# Patient Record
Sex: FEMALE | Race: ASIAN | URBAN - METROPOLITAN AREA
[De-identification: names, ages, dates, MRNs, and addresses within clinical notes are randomized per-mention and may not be internally consistent; named-entity substitution may affect disease eponyms.]

---

## 2023-06-01 LAB
ALANINE AMINOTRANSFERASE (SGPT) (U/L) IN SER/PLAS: 15 U/L (ref 7–45)
ALBUMIN (G/DL) IN SER/PLAS: 4 G/DL (ref 3.4–5)
ALKALINE PHOSPHATASE (U/L) IN SER/PLAS: 94 U/L (ref 33–136)
ANION GAP IN SER/PLAS: 15 MMOL/L (ref 10–20)
ASPARTATE AMINOTRANSFERASE (SGOT) (U/L) IN SER/PLAS: 13 U/L (ref 9–39)
BASOPHILS (10*3/UL) IN BLOOD BY AUTOMATED COUNT: 0.01 X10E9/L (ref 0–0.1)
BASOPHILS/100 LEUKOCYTES IN BLOOD BY AUTOMATED COUNT: 0.2 % (ref 0–2)
BILIRUBIN TOTAL (MG/DL) IN SER/PLAS: 0.3 MG/DL (ref 0–1.2)
C REACTIVE PROTEIN (MG/L) IN SER/PLAS: 4.75 MG/DL
CALCIUM (MG/DL) IN SER/PLAS: 9.7 MG/DL (ref 8.6–10.6)
CARBON DIOXIDE, TOTAL (MMOL/L) IN SER/PLAS: 28 MMOL/L (ref 21–32)
CHLORIDE (MMOL/L) IN SER/PLAS: 103 MMOL/L (ref 98–107)
CREATININE (MG/DL) IN SER/PLAS: 0.4 MG/DL (ref 0.5–1.05)
EOSINOPHILS (10*3/UL) IN BLOOD BY AUTOMATED COUNT: 0.1 X10E9/L (ref 0–0.7)
EOSINOPHILS/100 LEUKOCYTES IN BLOOD BY AUTOMATED COUNT: 1.8 % (ref 0–6)
ERYTHROCYTE DISTRIBUTION WIDTH (RATIO) BY AUTOMATED COUNT: 12.8 % (ref 11.5–14.5)
ERYTHROCYTE MEAN CORPUSCULAR HEMOGLOBIN CONCENTRATION (G/DL) BY AUTOMATED: 32.4 G/DL (ref 32–36)
ERYTHROCYTE MEAN CORPUSCULAR VOLUME (FL) BY AUTOMATED COUNT: 93 FL (ref 80–100)
ERYTHROCYTES (10*6/UL) IN BLOOD BY AUTOMATED COUNT: 3.89 X10E12/L (ref 4–5.2)
GFR FEMALE: >90 ML/MIN/1.73M2
GLUCOSE (MG/DL) IN SER/PLAS: 95 MG/DL (ref 74–99)
HEMATOCRIT (%) IN BLOOD BY AUTOMATED COUNT: 36.1 % (ref 36–46)
HEMOGLOBIN (G/DL) IN BLOOD: 11.7 G/DL (ref 12–16)
IMMATURE GRANULOCYTES/100 LEUKOCYTES IN BLOOD BY AUTOMATED COUNT: 0.5 % (ref 0–0.9)
LEUKOCYTES (10*3/UL) IN BLOOD BY AUTOMATED COUNT: 5.6 X10E9/L (ref 4.4–11.3)
LYMPHOCYTES (10*3/UL) IN BLOOD BY AUTOMATED COUNT: 0.88 X10E9/L (ref 1.2–4.8)
LYMPHOCYTES/100 LEUKOCYTES IN BLOOD BY AUTOMATED COUNT: 15.6 % (ref 13–44)
MONOCYTES (10*3/UL) IN BLOOD BY AUTOMATED COUNT: 0.57 X10E9/L (ref 0.1–1)
MONOCYTES/100 LEUKOCYTES IN BLOOD BY AUTOMATED COUNT: 10.1 % (ref 2–10)
NEUTROPHILS (10*3/UL) IN BLOOD BY AUTOMATED COUNT: 4.04 X10E9/L (ref 1.2–7.7)
NEUTROPHILS/100 LEUKOCYTES IN BLOOD BY AUTOMATED COUNT: 71.8 % (ref 40–80)
NRBC (PER 100 WBCS) BY AUTOMATED COUNT: 0 /100 WBC (ref 0–0)
PLATELETS (10*3/UL) IN BLOOD AUTOMATED COUNT: 420 X10E9/L (ref 150–450)
POTASSIUM (MMOL/L) IN SER/PLAS: 4.2 MMOL/L (ref 3.5–5.3)
PROTEIN TOTAL: 7.5 G/DL (ref 6.4–8.2)
RHEUMATOID FACTOR (IU/ML) IN SERUM OR PLASMA: 330 IU/ML (ref 0–15)
SEDIMENTATION RATE, ERYTHROCYTE: 74 MM/H (ref 0–30)
SODIUM (MMOL/L) IN SER/PLAS: 142 MMOL/L (ref 136–145)
URATE (MG/DL) IN SER/PLAS: 4.5 MG/DL (ref 2.3–6.7)
UREA NITROGEN (MG/DL) IN SER/PLAS: 16 MG/DL (ref 6–23)

## 2023-06-05 LAB
ALBUMIN ELP: 3.8 G/DL (ref 3.4–5)
ALPHA 1: 0.4 G/DL (ref 0.2–0.6)
ALPHA 2: 0.9 G/DL (ref 0.4–1.1)
BETA: 1.1 G/DL (ref 0.5–1.2)
CITRULLINE ANTIBODY, IGG: 116 UNITS (ref 0–19)
GAMMA GLOBULIN: 1.4 G/DL (ref 0.5–1.4)
PATH REVIEW - SERUM IMMUNOFIXATION: NORMAL
PATH REVIEW-SERUM PROTEIN ELECTROPHORESIS: NORMAL
PROTEIN ELECTROPHORESIS INTERPRETATION: NORMAL
PROTEIN TOTAL: 7.5 G/DL (ref 6.4–8.2)
SERUM IMMUNOFIXATION INTERPRETATION: NORMAL

## 2023-08-03 LAB
BASOPHILS (10*3/UL) IN BLOOD BY AUTOMATED COUNT: 0.02 X10E9/L (ref 0–0.1)
BASOPHILS/100 LEUKOCYTES IN BLOOD BY AUTOMATED COUNT: 0.2 % (ref 0–2)
EOSINOPHILS (10*3/UL) IN BLOOD BY AUTOMATED COUNT: 0.06 X10E9/L (ref 0–0.7)
EOSINOPHILS/100 LEUKOCYTES IN BLOOD BY AUTOMATED COUNT: 0.7 % (ref 0–6)
ERYTHROCYTE DISTRIBUTION WIDTH (RATIO) BY AUTOMATED COUNT: 12.8 % (ref 11.5–14.5)
ERYTHROCYTE MEAN CORPUSCULAR HEMOGLOBIN CONCENTRATION (G/DL) BY AUTOMATED: 32.6 G/DL (ref 32–36)
ERYTHROCYTE MEAN CORPUSCULAR VOLUME (FL) BY AUTOMATED COUNT: 92 FL (ref 80–100)
ERYTHROCYTES (10*6/UL) IN BLOOD BY AUTOMATED COUNT: 4.07 X10E12/L (ref 4–5.2)
HEMATOCRIT (%) IN BLOOD BY AUTOMATED COUNT: 37.4 % (ref 36–46)
HEMOGLOBIN (G/DL) IN BLOOD: 12.2 G/DL (ref 12–16)
IMMATURE GRANULOCYTES/100 LEUKOCYTES IN BLOOD BY AUTOMATED COUNT: 0.6 % (ref 0–0.9)
LEUKOCYTES (10*3/UL) IN BLOOD BY AUTOMATED COUNT: 9 X10E9/L (ref 4.4–11.3)
LYMPHOCYTES (10*3/UL) IN BLOOD BY AUTOMATED COUNT: 0.81 X10E9/L (ref 1.2–4.8)
LYMPHOCYTES/100 LEUKOCYTES IN BLOOD BY AUTOMATED COUNT: 9 % (ref 13–44)
MONOCYTES (10*3/UL) IN BLOOD BY AUTOMATED COUNT: 0.38 X10E9/L (ref 0.1–1)
MONOCYTES/100 LEUKOCYTES IN BLOOD BY AUTOMATED COUNT: 4.2 % (ref 2–10)
NEUTROPHILS (10*3/UL) IN BLOOD BY AUTOMATED COUNT: 7.66 X10E9/L (ref 1.2–7.7)
NEUTROPHILS/100 LEUKOCYTES IN BLOOD BY AUTOMATED COUNT: 85.3 % (ref 40–80)
NRBC (PER 100 WBCS) BY AUTOMATED COUNT: 0 /100 WBC (ref 0–0)
PLATELETS (10*3/UL) IN BLOOD AUTOMATED COUNT: 394 X10E9/L (ref 150–450)

## 2023-08-04 LAB
ALANINE AMINOTRANSFERASE (SGPT) (U/L) IN SER/PLAS: 19 U/L (ref 7–45)
ALBUMIN (G/DL) IN SER/PLAS: 4.3 G/DL (ref 3.4–5)
ALKALINE PHOSPHATASE (U/L) IN SER/PLAS: 67 U/L (ref 33–136)
ANION GAP IN SER/PLAS: 16 MMOL/L (ref 10–20)
ASPARTATE AMINOTRANSFERASE (SGOT) (U/L) IN SER/PLAS: 18 U/L (ref 9–39)
BILIRUBIN TOTAL (MG/DL) IN SER/PLAS: 0.4 MG/DL (ref 0–1.2)
C REACTIVE PROTEIN (MG/L) IN SER/PLAS: 3.79 MG/DL
CALCIUM (MG/DL) IN SER/PLAS: 10.6 MG/DL (ref 8.6–10.6)
CARBON DIOXIDE, TOTAL (MMOL/L) IN SER/PLAS: 26 MMOL/L (ref 21–32)
CHLORIDE (MMOL/L) IN SER/PLAS: 104 MMOL/L (ref 98–107)
CREATININE (MG/DL) IN SER/PLAS: 0.49 MG/DL (ref 0.5–1.05)
GFR FEMALE: >90 ML/MIN/1.73M2
GLUCOSE (MG/DL) IN SER/PLAS: 122 MG/DL (ref 74–99)
POTASSIUM (MMOL/L) IN SER/PLAS: 4.4 MMOL/L (ref 3.5–5.3)
PROTEIN TOTAL: 7.8 G/DL (ref 6.4–8.2)
SEDIMENTATION RATE, ERYTHROCYTE: 59 MM/H (ref 0–30)
SODIUM (MMOL/L) IN SER/PLAS: 142 MMOL/L (ref 136–145)
UREA NITROGEN (MG/DL) IN SER/PLAS: 16 MG/DL (ref 6–23)

## 2023-09-08 PROBLEM — M54.2 NECK PAIN: Status: ACTIVE | Noted: 2023-09-08

## 2023-09-08 PROBLEM — M17.11 OSTEOARTHRITIS OF RIGHT KNEE: Status: ACTIVE | Noted: 2023-09-08

## 2023-09-08 PROBLEM — N95.1 MENOPAUSAL SYMPTOM: Status: ACTIVE | Noted: 2023-09-08

## 2023-09-08 PROBLEM — J30.9 ALLERGIC RHINITIS: Status: ACTIVE | Noted: 2023-09-08

## 2023-09-08 PROBLEM — M25.569 KNEE PAIN: Status: ACTIVE | Noted: 2023-09-08

## 2023-09-08 PROBLEM — N95.1 FEMALE CLIMACTERIC STATE: Status: ACTIVE | Noted: 2023-09-08

## 2023-09-08 PROBLEM — N95.2 POSTMENOPAUSAL ATROPHIC VAGINITIS: Status: ACTIVE | Noted: 2023-09-08

## 2023-09-08 PROBLEM — R91.1 LUNG NODULE: Status: ACTIVE | Noted: 2023-09-08

## 2023-09-08 PROBLEM — M81.0 AGE-RELATED OSTEOPOROSIS WITHOUT CURRENT PATHOLOGICAL FRACTURE: Status: ACTIVE | Noted: 2023-09-08

## 2023-09-08 PROBLEM — M25.561 PAIN IN RIGHT KNEE: Status: ACTIVE | Noted: 2023-09-08

## 2023-09-08 PROBLEM — S83.91XA SPRAIN OF RIGHT KNEE: Status: ACTIVE | Noted: 2023-09-08

## 2023-09-08 PROBLEM — M93.90 OSTEOCHONDROPATHY: Status: ACTIVE | Noted: 2023-09-08

## 2023-09-08 PROBLEM — M25.519 SHOULDER JOINT PAIN: Status: ACTIVE | Noted: 2023-09-08

## 2023-09-08 PROBLEM — R06.2 WHEEZING: Status: ACTIVE | Noted: 2023-09-08

## 2023-09-08 PROBLEM — H61.20 WAX IN EAR: Status: ACTIVE | Noted: 2023-09-08

## 2023-09-08 PROBLEM — M17.12 OSTEOARTHRITIS OF LEFT KNEE: Status: ACTIVE | Noted: 2023-09-08

## 2023-09-08 PROBLEM — H61.23 IMPACTED CERUMEN OF BOTH EARS: Status: ACTIVE | Noted: 2023-09-08

## 2023-09-08 PROBLEM — S16.1XXA CERVICAL STRAIN: Status: ACTIVE | Noted: 2023-09-08

## 2023-09-08 PROBLEM — G47.00 INSOMNIA: Status: ACTIVE | Noted: 2023-09-08

## 2023-09-08 PROBLEM — M47.812 OSTEOARTHRITIS OF CERVICAL SPINE: Status: ACTIVE | Noted: 2023-09-08

## 2023-09-08 PROBLEM — J45.909 ASTHMATIC BRONCHITIS, UNSPECIFIED ASTHMA SEVERITY, UNCOMPLICATED (HHS-HCC): Status: ACTIVE | Noted: 2023-09-08

## 2023-09-08 PROBLEM — E78.5 HYPERLIPIDEMIA: Status: ACTIVE | Noted: 2023-09-08

## 2023-09-08 PROBLEM — R59.9 LYMPH NODES ENLARGED: Status: ACTIVE | Noted: 2023-09-08

## 2023-09-08 PROBLEM — J31.0 CHRONIC RHINITIS: Status: ACTIVE | Noted: 2023-09-08

## 2023-09-08 RX ORDER — TIZANIDINE 4 MG/1
1 TABLET ORAL 3 TIMES DAILY PRN
COMMUNITY
End: 2024-01-24 | Stop reason: WASHOUT

## 2023-09-08 RX ORDER — ALENDRONATE SODIUM 70 MG/1
1 TABLET ORAL
COMMUNITY
Start: 2022-11-28 | End: 2023-11-01

## 2023-09-08 RX ORDER — MONTELUKAST SODIUM 10 MG/1
1 TABLET ORAL DAILY
COMMUNITY
End: 2024-01-24 | Stop reason: WASHOUT

## 2023-09-08 RX ORDER — ATORVASTATIN CALCIUM 20 MG/1
1 TABLET, FILM COATED ORAL DAILY
COMMUNITY
End: 2024-01-24 | Stop reason: SDUPTHER

## 2023-09-08 RX ORDER — CETIRIZINE HYDROCHLORIDE 10 MG/1
TABLET ORAL
COMMUNITY
End: 2024-01-24 | Stop reason: WASHOUT

## 2023-09-08 RX ORDER — PNV NO.95/FERROUS FUM/FOLIC AC 28MG-0.8MG
TABLET ORAL DAILY
COMMUNITY
End: 2024-05-16 | Stop reason: WASHOUT

## 2023-09-08 RX ORDER — IBUPROFEN 200 MG
1 TABLET ORAL DAILY PRN
COMMUNITY
End: 2024-01-24 | Stop reason: WASHOUT

## 2023-09-08 RX ORDER — CALCIUM CARBONATE 500(1250)
1 TABLET ORAL 2 TIMES DAILY
COMMUNITY

## 2023-09-08 RX ORDER — ALBUTEROL SULFATE 90 UG/1
1 AEROSOL, METERED RESPIRATORY (INHALATION) AS NEEDED
COMMUNITY
End: 2024-02-05 | Stop reason: WASHOUT

## 2023-09-08 RX ORDER — MOMETASONE FUROATE AND FORMOTEROL FUMARATE DIHYDRATE 100; 5 UG/1; UG/1
2 AEROSOL RESPIRATORY (INHALATION) 2 TIMES DAILY
COMMUNITY
Start: 2017-11-06 | End: 2024-01-24 | Stop reason: WASHOUT

## 2023-09-08 RX ORDER — BUDESONIDE AND FORMOTEROL FUMARATE DIHYDRATE 80; 4.5 UG/1; UG/1
2 AEROSOL RESPIRATORY (INHALATION)
COMMUNITY

## 2023-09-08 RX ORDER — BUDESONIDE AND FORMOTEROL FUMARATE DIHYDRATE 160; 4.5 UG/1; UG/1
2 AEROSOL RESPIRATORY (INHALATION) 2 TIMES DAILY
COMMUNITY
Start: 2017-09-19 | End: 2024-01-24 | Stop reason: WASHOUT

## 2023-09-08 RX ORDER — GABAPENTIN 400 MG/1
1 CAPSULE ORAL EVERY 6 HOURS
COMMUNITY
End: 2024-01-24 | Stop reason: WASHOUT

## 2023-09-08 RX ORDER — ACETAMINOPHEN 500 MG
1 TABLET ORAL EVERY 4 HOURS PRN
COMMUNITY
End: 2024-02-05 | Stop reason: WASHOUT

## 2023-09-08 RX ORDER — BIOTIN 100 %
POWDER (GRAM) MISCELLANEOUS
COMMUNITY
End: 2024-01-24 | Stop reason: WASHOUT

## 2023-10-04 DIAGNOSIS — M19.90 INFLAMMATORY ARTHRITIS: Primary | ICD-10-CM

## 2023-10-04 RX ORDER — CELECOXIB 200 MG/1
200 CAPSULE ORAL ONCE AS NEEDED
COMMUNITY
Start: 2023-08-29 | End: 2023-10-04 | Stop reason: SDUPTHER

## 2023-10-04 RX ORDER — CELECOXIB 200 MG/1
200 CAPSULE ORAL ONCE AS NEEDED
Qty: 30 CAPSULE | Refills: 0 | Status: SHIPPED | OUTPATIENT
Start: 2023-10-04 | End: 2023-11-01

## 2023-10-23 ENCOUNTER — TELEPHONE (OUTPATIENT)
Dept: PRIMARY CARE | Facility: CLINIC | Age: 68
End: 2023-10-23

## 2023-10-30 DIAGNOSIS — M05.9 SEROPOSITIVE RHEUMATOID ARTHRITIS (MULTI): Primary | ICD-10-CM

## 2023-10-30 RX ORDER — METHOTREXATE 2.5 MG/1
TABLET ORAL
Qty: 28 TABLET | Refills: 0 | Status: SHIPPED | OUTPATIENT
Start: 2023-10-30 | End: 2023-12-07 | Stop reason: SDUPTHER

## 2023-10-30 RX ORDER — METHOTREXATE 2.5 MG/1
2.5 TABLET ORAL
COMMUNITY
Start: 2023-08-07 | End: 2023-10-30 | Stop reason: SDUPTHER

## 2023-10-31 DIAGNOSIS — M19.90 INFLAMMATORY ARTHRITIS: ICD-10-CM

## 2023-10-31 DIAGNOSIS — M81.0 AGE-RELATED OSTEOPOROSIS WITHOUT CURRENT PATHOLOGICAL FRACTURE: ICD-10-CM

## 2023-10-31 DIAGNOSIS — M05.9 RHEUMATOID ARTHRITIS WITH RHEUMATOID FACTOR, UNSPECIFIED (MULTI): ICD-10-CM

## 2023-11-01 RX ORDER — FOLIC ACID 1 MG/1
1 TABLET ORAL DAILY
Qty: 90 TABLET | Refills: 0 | Status: SHIPPED | OUTPATIENT
Start: 2023-11-01 | End: 2023-12-07 | Stop reason: SDUPTHER

## 2023-11-01 RX ORDER — ALENDRONATE SODIUM 70 MG/1
TABLET ORAL
Qty: 12 TABLET | Refills: 3 | Status: SHIPPED | OUTPATIENT
Start: 2023-11-01 | End: 2023-12-01 | Stop reason: SDUPTHER

## 2023-11-01 RX ORDER — CELECOXIB 200 MG/1
CAPSULE ORAL
Qty: 30 CAPSULE | Refills: 0 | Status: SHIPPED | OUTPATIENT
Start: 2023-11-01 | End: 2023-12-11

## 2023-11-09 ENCOUNTER — APPOINTMENT (OUTPATIENT)
Dept: PRIMARY CARE | Facility: CLINIC | Age: 68
End: 2023-11-09
Payer: COMMERCIAL

## 2023-12-01 ENCOUNTER — TELEPHONE (OUTPATIENT)
Dept: OBSTETRICS AND GYNECOLOGY | Facility: CLINIC | Age: 68
End: 2023-12-01
Payer: COMMERCIAL

## 2023-12-01 DIAGNOSIS — M81.0 AGE-RELATED OSTEOPOROSIS WITHOUT CURRENT PATHOLOGICAL FRACTURE: ICD-10-CM

## 2023-12-01 RX ORDER — ALENDRONATE SODIUM 70 MG/1
TABLET ORAL
Qty: 12 TABLET | Refills: 3 | Status: SHIPPED | OUTPATIENT
Start: 2023-12-01 | End: 2023-12-07 | Stop reason: SDUPTHER

## 2023-12-01 NOTE — TELEPHONE ENCOUNTER
Est pt last seen 11/28/2022 discuss Dexa / Annual sched for 02/05/2024 /  pt left vm requesting refill Alendronate 70 mg 1 po weekly / pt stated that she needs to have 90 day supply  for insurance / pt confirmed [facility] listed / Message to Dr Foss / ( ok to call ? )

## 2023-12-07 ENCOUNTER — LAB (OUTPATIENT)
Dept: LAB | Facility: LAB | Age: 68
End: 2023-12-07
Payer: MEDICARE

## 2023-12-07 ENCOUNTER — OFFICE VISIT (OUTPATIENT)
Dept: RHEUMATOLOGY | Facility: CLINIC | Age: 68
End: 2023-12-07
Payer: MEDICARE

## 2023-12-07 VITALS
SYSTOLIC BLOOD PRESSURE: 121 MMHG | DIASTOLIC BLOOD PRESSURE: 85 MMHG | BODY MASS INDEX: 23.03 KG/M2 | HEART RATE: 86 BPM | HEIGHT: 61 IN | WEIGHT: 122 LBS

## 2023-12-07 DIAGNOSIS — M06.9 RHEUMATOID ARTHRITIS, INVOLVING UNSPECIFIED SITE, UNSPECIFIED WHETHER RHEUMATOID FACTOR PRESENT (MULTI): ICD-10-CM

## 2023-12-07 DIAGNOSIS — M05.9 RHEUMATOID ARTHRITIS WITH RHEUMATOID FACTOR, UNSPECIFIED (MULTI): Primary | ICD-10-CM

## 2023-12-07 DIAGNOSIS — R94.5 ABNORMAL RESULTS OF LIVER FUNCTION STUDIES: ICD-10-CM

## 2023-12-07 DIAGNOSIS — M25.511 CHRONIC RIGHT SHOULDER PAIN: ICD-10-CM

## 2023-12-07 DIAGNOSIS — G89.29 CHRONIC RIGHT SHOULDER PAIN: ICD-10-CM

## 2023-12-07 DIAGNOSIS — M81.0 AGE-RELATED OSTEOPOROSIS WITHOUT CURRENT PATHOLOGICAL FRACTURE: ICD-10-CM

## 2023-12-07 DIAGNOSIS — M05.9 RHEUMATOID ARTHRITIS WITH POSITIVE RHEUMATOID FACTOR, INVOLVING UNSPECIFIED SITE (MULTI): Primary | ICD-10-CM

## 2023-12-07 DIAGNOSIS — M05.9 RHEUMATOID ARTHRITIS WITH POSITIVE RHEUMATOID FACTOR, INVOLVING UNSPECIFIED SITE (MULTI): ICD-10-CM

## 2023-12-07 DIAGNOSIS — M05.9 RHEUMATOID ARTHRITIS WITH RHEUMATOID FACTOR, UNSPECIFIED (MULTI): ICD-10-CM

## 2023-12-07 DIAGNOSIS — M05.9 SEROPOSITIVE RHEUMATOID ARTHRITIS (MULTI): ICD-10-CM

## 2023-12-07 LAB
ALBUMIN SERPL BCP-MCNC: 4.3 G/DL (ref 3.4–5)
ALP SERPL-CCNC: 77 U/L (ref 33–136)
ALT SERPL W P-5'-P-CCNC: 17 U/L (ref 7–45)
ANION GAP SERPL CALC-SCNC: 14 MMOL/L (ref 10–20)
AST SERPL W P-5'-P-CCNC: 17 U/L (ref 9–39)
BASOPHILS # BLD AUTO: 0.01 X10*3/UL (ref 0–0.1)
BASOPHILS NFR BLD AUTO: 0.1 %
BILIRUB SERPL-MCNC: 0.6 MG/DL (ref 0–1.2)
BUN SERPL-MCNC: 21 MG/DL (ref 6–23)
CALCIUM SERPL-MCNC: 9.9 MG/DL (ref 8.6–10.6)
CHLORIDE SERPL-SCNC: 102 MMOL/L (ref 98–107)
CO2 SERPL-SCNC: 27 MMOL/L (ref 21–32)
CREAT SERPL-MCNC: 0.48 MG/DL (ref 0.5–1.05)
CRP SERPL-MCNC: 3.29 MG/DL
EOSINOPHIL # BLD AUTO: 0.1 X10*3/UL (ref 0–0.7)
EOSINOPHIL NFR BLD AUTO: 1.4 %
ERYTHROCYTE [DISTWIDTH] IN BLOOD BY AUTOMATED COUNT: 12.9 % (ref 11.5–14.5)
ERYTHROCYTE [SEDIMENTATION RATE] IN BLOOD BY WESTERGREN METHOD: 50 MM/H (ref 0–30)
GFR SERPL CREATININE-BSD FRML MDRD: >90 ML/MIN/1.73M*2
GLUCOSE SERPL-MCNC: 112 MG/DL (ref 74–99)
HAV IGM SER QL: NONREACTIVE
HBV CORE IGM SER QL: NONREACTIVE
HBV SURFACE AG SERPL QL IA: NONREACTIVE
HCT VFR BLD AUTO: 39.1 % (ref 36–46)
HCV AB SER QL: NONREACTIVE
HGB BLD-MCNC: 12.9 G/DL (ref 12–16)
IMM GRANULOCYTES # BLD AUTO: 0.03 X10*3/UL (ref 0–0.7)
IMM GRANULOCYTES NFR BLD AUTO: 0.4 % (ref 0–0.9)
LYMPHOCYTES # BLD AUTO: 1.05 X10*3/UL (ref 1.2–4.8)
LYMPHOCYTES NFR BLD AUTO: 14.8 %
MCH RBC QN AUTO: 31.1 PG (ref 26–34)
MCHC RBC AUTO-ENTMCNC: 33 G/DL (ref 32–36)
MCV RBC AUTO: 94 FL (ref 80–100)
MONOCYTES # BLD AUTO: 0.24 X10*3/UL (ref 0.1–1)
MONOCYTES NFR BLD AUTO: 3.4 %
NEUTROPHILS # BLD AUTO: 5.68 X10*3/UL (ref 1.2–7.7)
NEUTROPHILS NFR BLD AUTO: 79.9 %
NRBC BLD-RTO: 0 /100 WBCS (ref 0–0)
PLATELET # BLD AUTO: 446 X10*3/UL (ref 150–450)
POTASSIUM SERPL-SCNC: 4 MMOL/L (ref 3.5–5.3)
PROT SERPL-MCNC: 7.9 G/DL (ref 6.4–8.2)
RBC # BLD AUTO: 4.15 X10*6/UL (ref 4–5.2)
SODIUM SERPL-SCNC: 139 MMOL/L (ref 136–145)
WBC # BLD AUTO: 7.1 X10*3/UL (ref 4.4–11.3)

## 2023-12-07 PROCEDURE — 85025 COMPLETE CBC W/AUTO DIFF WBC: CPT

## 2023-12-07 PROCEDURE — 99214 OFFICE O/P EST MOD 30 MIN: CPT | Performed by: INTERNAL MEDICINE

## 2023-12-07 PROCEDURE — 1159F MED LIST DOCD IN RCRD: CPT | Performed by: INTERNAL MEDICINE

## 2023-12-07 PROCEDURE — 80074 ACUTE HEPATITIS PANEL: CPT

## 2023-12-07 PROCEDURE — 86140 C-REACTIVE PROTEIN: CPT

## 2023-12-07 PROCEDURE — 85652 RBC SED RATE AUTOMATED: CPT

## 2023-12-07 PROCEDURE — 86481 TB AG RESPONSE T-CELL SUSP: CPT

## 2023-12-07 PROCEDURE — 36415 COLL VENOUS BLD VENIPUNCTURE: CPT

## 2023-12-07 PROCEDURE — 80053 COMPREHEN METABOLIC PANEL: CPT

## 2023-12-07 RX ORDER — METHOTREXATE 2.5 MG/1
TABLET ORAL
Qty: 84 TABLET | Refills: 0 | Status: SHIPPED | OUTPATIENT
Start: 2023-12-07 | End: 2024-01-24 | Stop reason: WASHOUT

## 2023-12-07 RX ORDER — KETOCONAZOLE 20 MG/ML
SHAMPOO, SUSPENSION TOPICAL
COMMUNITY
Start: 2023-10-22 | End: 2024-01-24 | Stop reason: WASHOUT

## 2023-12-07 RX ORDER — ALENDRONATE SODIUM 70 MG/1
TABLET ORAL
Qty: 12 TABLET | Refills: 1 | Status: SHIPPED | OUTPATIENT
Start: 2023-12-07 | End: 2024-01-24 | Stop reason: WASHOUT

## 2023-12-07 RX ORDER — FOLIC ACID 1 MG/1
1 TABLET ORAL DAILY
Qty: 90 TABLET | Refills: 0 | Status: SHIPPED | OUTPATIENT
Start: 2023-12-07 | End: 2024-02-01 | Stop reason: SDUPTHER

## 2023-12-07 RX ORDER — METHYLPREDNISOLONE 4 MG/1
TABLET ORAL
COMMUNITY
Start: 2023-08-08 | End: 2023-12-07 | Stop reason: WASHOUT

## 2023-12-07 RX ORDER — DOCUSATE SODIUM 100 MG/1
100 CAPSULE, LIQUID FILLED ORAL 2 TIMES DAILY
COMMUNITY
Start: 2023-05-23 | End: 2024-01-24 | Stop reason: WASHOUT

## 2023-12-07 RX ORDER — PREDNISONE 5 MG/1
5 TABLET ORAL DAILY
COMMUNITY
Start: 2023-08-04 | End: 2023-12-07 | Stop reason: WASHOUT

## 2023-12-07 RX ORDER — METHOCARBAMOL 750 MG/1
TABLET, FILM COATED ORAL
COMMUNITY
Start: 2023-04-18 | End: 2024-01-24 | Stop reason: WASHOUT

## 2023-12-07 RX ORDER — OXYCODONE HYDROCHLORIDE 5 MG/1
5 TABLET ORAL EVERY 8 HOURS PRN
COMMUNITY
Start: 2023-04-04 | End: 2024-01-24 | Stop reason: WASHOUT

## 2023-12-07 RX ORDER — COVID-19 ANTIGEN TEST
KIT MISCELLANEOUS
COMMUNITY
Start: 2023-03-17 | End: 2024-01-24 | Stop reason: WASHOUT

## 2023-12-07 NOTE — PROGRESS NOTES
Follow-up Rheumatology Patient Visit    Chief Complaint:  Noé Valente is a 68 y.o. female presenting today for Follow-up (Refills, pt requesting 3 months).    History of Presenting Problem:   68-year-old female with history of DJD of the cervical spine status post fusion presents with various joint complaint. Patient reported she started to have persistent joint complaints done in November 2021 mostly in her bilateral shoulders, it was initially attributed to her degenerative spine disease she underwent spinal surgery but symptoms have persisted and now she is having stiffness in her hands and knees she reports painful range of motion in her bilateral shoulder. And her wrists and hands. She reports occasional stiffness in her knees.  She has tried taking Aleve 2 pills twice a day which offers 50% relief.She is also on gabapentin  Today she reports about 80% improvement on current Regimen. She is tolerating MTX 7 pills weekly with folic acid.  She also takes Celebrex 200 mg at night.   She report issue with chronic right shoulder pain x 1 year  with diccult lifting this past 75 degress. She report occasional triggering of her fingers with less ROM.     Problem List:   Patient Active Problem List   Diagnosis    Age-related osteoporosis without current pathological fracture    Allergic rhinitis    Chronic rhinitis    Asthmatic bronchitis, unspecified asthma severity, uncomplicated    Cervical strain    Female climacteric state    Hyperlipidemia    Impacted cerumen of both ears    Insomnia    Pain in right knee    Knee pain    Low back pain    Lung nodule    Lymph nodes enlarged    Menopausal symptom    Neck pain    Osteoarthritis of cervical spine    Osteoarthritis of left knee    Osteoarthritis of right knee    Osteochondropathy    Postmenopausal atrophic vaginitis    Shoulder joint pain    Skin sensation disturbance    Sprain of right knee    Wax in ear    Wheezing       Past Medical History:   Past Medical History:    Diagnosis Date    Personal history of nicotine dependence     History of nicotine dependence    Personal history of other diseases of the respiratory system     History of asthma    Personal history of other diseases of the respiratory system     History of allergic rhinitis    Pulmonary fibrosis, unspecified (CMS/HCC)     Postinflammatory pulmonary fibrosis       Surgical History:   Past Surgical History:   Procedure Laterality Date    SINUS SURGERY  06/10/2013    Sinus Surgery        Allergies:   Allergies   Allergen Reactions    Erythromycin Unknown    Ibuprofen Unknown    Sulfa (Sulfonamide Antibiotics) Unknown       Medications:   Current Outpatient Medications:     acetaminophen (Tylenol Extra Strength) 500 mg tablet, Take 1 tablet (500 mg) by mouth every 4 hours if needed., Disp: , Rfl:     albuterol 90 mcg/actuation inhaler, Inhale 1 puff if needed., Disp: , Rfl:     atorvastatin (Lipitor) 20 mg tablet, Take 1 tablet (20 mg) by mouth once daily., Disp: , Rfl:     azelastine HCl (AZELASTINE NASL), Administer 2 sprays into affected nostril(s) 2 times a day., Disp: , Rfl:     biotin 100 % powder, as directed, Disp: , Rfl:     budesonide-formoteroL (Symbicort) 160-4.5 mcg/actuation inhaler, Inhale 2 puffs 2 times a day. RINSE MOUTH AFTER USE., Disp: , Rfl:     budesonide-formoteroL (Symbicort) 80-4.5 mcg/actuation inhaler, Inhale 2 puffs 2 times a day., Disp: , Rfl:     calcium carbonate (Oscal) 500 mg calcium (1,250 mg) tablet, Take 1 tablet (1,250 mg) by mouth 2 times a day., Disp: , Rfl:     cartilage/collagen/bor/hyalur (JOINT HEALTH ORAL), Take by mouth. As directed, Disp: , Rfl:     celecoxib (CeleBREX) 200 mg capsule, TAKE 1 CAPSULE BY MOUTH EVERY DAY AS NEEDED FOR MILD PAIN, Disp: 30 capsule, Rfl: 0    cetirizine (ZyrTEC) 10 mg tablet, Take by mouth., Disp: , Rfl:     cholecalciferol, vitamin D3, (VITAMIN D3 ORAL), Take 1 tablet by mouth once daily.  Vitamin D3 (1.2mg) 1.2 mg  Patient si tablet by  "mouth Daily, Disp: , Rfl:     cyanocobalamin (Vitamin B-12) 100 mcg tablet, once daily. As directed, Disp: , Rfl:     docusate sodium (Colace) 100 mg capsule, Take 1 capsule (100 mg) by mouth 2 times a day., Disp: , Rfl:     gabapentin (Neurontin) 400 mg capsule, Take 1 capsule (400 mg) by mouth every 6 hours., Disp: , Rfl:     ibuprofen 200 mg tablet, Take 1 tablet (200 mg) by mouth once daily as needed. With food or milk, Disp: , Rfl:     ketoconazole (NIZOral) 2 % shampoo, USE AS DIRECTED, Disp: , Rfl:     methocarbamol (Robaxin) 750 mg tablet, 1 TABLET BY ORAL/FEEDING TUBE ROUTE FOUR TIMES DAILY AS NEEDED., Disp: , Rfl:     mometasone-formoterol (Dulera) 100-5 mcg/actuation inhaler, Inhale 2 puffs 2 times a day., Disp: , Rfl:     montelukast (Singulair) 10 mg tablet, Take 1 tablet (10 mg) by mouth once daily., Disp: , Rfl:     mv,calcium,min/iron/folic/vitK (MULTI FOR HER ORAL), Take by mouth once daily. As directed, Disp: , Rfl:     oxyCODONE (Roxicodone) 5 mg immediate release tablet, Take 1 tablet (5 mg) by mouth every 8 hours if needed., Disp: , Rfl:     [employer] At-Home COVID-19 Test kit, USE AS DIRECTED PER PACKAGE, Disp: , Rfl:     tiZANidine (Zanaflex) 4 mg tablet, Take 1 tablet (4 mg) by mouth 3 times a day as needed., Disp: , Rfl:     TRIAMCINOLONE ACETONIDE, BULK, MISC, Triamcinolone Acet & Anesth 40 MG/ML  Patient sig: as directed Combination, Disp: , Rfl:     alendronate (Fosamax) 70 mg tablet, 1 TABLET ORALLY ONCE WEEKLY 90 DAYS, Disp: 12 tablet, Rfl: 1    folic acid (Folvite) 1 mg tablet, Take 1 tablet (1 mg) by mouth once daily., Disp: 90 tablet, Rfl: 0    methotrexate (Trexall) 2.5 mg tablet, Take 7 tablets once weekly (3 tablets with breakfast and 4 tablets with dinner), Disp: 84 tablet, Rfl: 0      Objective   Physical Examination:    Visit Vitals  /85   Pulse 86   Ht 1.549 m (5' 1\")   Wt 55.3 kg (122 lb)   BMI 23.05 kg/m²   BSA 1.54 m²      Gen: NAD, A&O x 3  HEENT: clear sclera and " conjunctiva,     Musculoskeletal:   Neck; WNL, full ROM  Shoulder: Pain with shoulder range of motion testing on the left she cannot AB duct past 75 degrees  Elbow:WNL, full ROM, no effusion noted  Wrist and fingers; Persistent swelling 2nd and 3rd MCP joints bilaterally, swelling noted in the wrists and PIP is resolved  Knees: No effusions or crepitation, full ROM.  Hips; WNL, full ROM, Negative Telly test  Ankle, Feet; WNL, full ROM    Skin: No rashes or lesions seen, no nail changes  Neuro: A&O x3, Normal Gait    Procedures :None    Orders:  Orders Placed This Encounter   Procedures    CBC and Auto Differential    Comprehensive Metabolic Panel    C-Reactive Protein    Sedimentation Rate    Hepatitis Panel, Acute    T-Spot TB    Referral to Orthopaedic Surgery        Provider Impression:   Assessment/Plan   Encounter Diagnoses   Name Primary?    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 Blood work shows positive rheumatoid factor and CCP  -Continue with methotrexate, continue with 7 pills weekly with folic acid daily . After discussion We plan to start Enbrel.  Risk and benefit of treatment was discussed with patient patient material was also provided  -Continue with Celebrex 200 mg daily as needed, recommend Pepcid for GI prophylaxis  -Check routine labs including TB and hepatitis screening  -Recommend patient follow-up with orthopedics regarding chronic shoulder pain  -Follow-up in  2-3 months

## 2023-12-08 RX ORDER — ETANERCEPT 50 MG/ML
50 SOLUTION SUBCUTANEOUS
Qty: 4 ML | Refills: 2 | Status: SHIPPED | OUTPATIENT
Start: 2023-12-08 | End: 2024-01-24 | Stop reason: WASHOUT

## 2023-12-09 DIAGNOSIS — M19.90 INFLAMMATORY ARTHRITIS: ICD-10-CM

## 2023-12-10 LAB
NIL(NEG) CONTROL SPOT COUNT: NORMAL
PANEL A SPOT COUNT: 0
PANEL B SPOT COUNT: 0
POS CONTROL SPOT COUNT: NORMAL
T-SPOT. TB INTERPRETATION: NEGATIVE

## 2023-12-11 ENCOUNTER — TELEPHONE (OUTPATIENT)
Dept: RHEUMATOLOGY | Facility: CLINIC | Age: 68
End: 2023-12-11
Payer: MEDICARE

## 2023-12-11 ENCOUNTER — SPECIALTY PHARMACY (OUTPATIENT)
Dept: PHARMACY | Facility: CLINIC | Age: 68
End: 2023-12-11

## 2023-12-11 RX ORDER — CELECOXIB 200 MG/1
CAPSULE ORAL
Qty: 30 CAPSULE | Refills: 1 | Status: SHIPPED | OUTPATIENT
Start: 2023-12-11 | End: 2024-01-24 | Stop reason: WASHOUT

## 2023-12-11 NOTE — TELEPHONE ENCOUNTER
----- Message from Nehal Harvey DO sent at 12/11/2023 12:31 PM EST -----  Please tell this patient to increase the methotrexate to 8 pills weekly .  She should take 4 in the morning and 4 at night on the same day

## 2023-12-15 ENCOUNTER — HOSPITAL ENCOUNTER (OUTPATIENT)
Dept: RADIOLOGY | Facility: HOSPITAL | Age: 68
Discharge: HOME | End: 2023-12-15
Payer: MEDICARE

## 2023-12-15 ENCOUNTER — OFFICE VISIT (OUTPATIENT)
Dept: ORTHOPEDIC SURGERY | Facility: HOSPITAL | Age: 68
End: 2023-12-15
Payer: MEDICARE

## 2023-12-15 DIAGNOSIS — G89.29 CHRONIC RIGHT SHOULDER PAIN: ICD-10-CM

## 2023-12-15 DIAGNOSIS — M25.512 LEFT SHOULDER PAIN, UNSPECIFIED CHRONICITY: ICD-10-CM

## 2023-12-15 DIAGNOSIS — M25.511 CHRONIC RIGHT SHOULDER PAIN: ICD-10-CM

## 2023-12-15 DIAGNOSIS — M25.511 PAIN IN JOINT OF RIGHT SHOULDER: ICD-10-CM

## 2023-12-15 PROCEDURE — 73030 X-RAY EXAM OF SHOULDER: CPT | Mod: BILATERAL PROCEDURE | Performed by: RADIOLOGY

## 2023-12-15 PROCEDURE — 1159F MED LIST DOCD IN RCRD: CPT | Performed by: ORTHOPAEDIC SURGERY

## 2023-12-15 PROCEDURE — 73030 X-RAY EXAM OF SHOULDER: CPT | Mod: 50,FY

## 2023-12-15 PROCEDURE — 99214 OFFICE O/P EST MOD 30 MIN: CPT | Performed by: ORTHOPAEDIC SURGERY

## 2023-12-15 PROCEDURE — 99204 OFFICE O/P NEW MOD 45 MIN: CPT | Performed by: ORTHOPAEDIC SURGERY

## 2023-12-15 NOTE — PROGRESS NOTES
Patient for evaluation of her shoulder she has pain and weakness in the right shoulder that has persisted despite cervical spine surgery and pain management with injections.  Apparently he has not had an injection into the shoulder but rather the spine.  Nothing has helped her shoulder pain and it is worse in the morning she finds she cannot elevate her right arm without using the other arm for assistance.  She has similar symptoms in the left shoulder but much lesser degree.    The patient is pleasant and cooperative.  The patient is alert and oriented ×3.  Auditory function is intact.  The patient is a good historian.  The patient is not in acute distress.  Eye exam significant for nonicteric sclera, intact ocular muscle movement.  Breathing is rhythmic symmetric and nonlabored.  Right shoulder range of motion active elevation 90 degrees passive elevation 150 degrees external rotation and abduction 90 degrees with pain motor power abduction 3 external rotation 4 internal rotation 5  strength 5.  Radial pulse easily palpable in the peripheral edema.  No obvious asymmetry or atrophy.  Left shoulder has full range of motion with slight pain on internal rotation and abduction.  Motor power in abduction 5/5.    X-rays of both shoulders were obtained and reviewed in detail today that show subacromial glenohumeral spaces well-preserved no fracture dislocation subluxation arthritic degenerative changes of the glenohumeral joint    Rotator cuff tear right shoulder    I am concerned the patient has rotator cuff tear I would like her to have an MRI scan of the right shoulder.  I would like to see her back when that is been obtained.    This was dictated using voice recognition software and not corrected for grammatical or spelling errors.

## 2023-12-18 ENCOUNTER — ANCILLARY PROCEDURE (OUTPATIENT)
Dept: RADIOLOGY | Facility: CLINIC | Age: 68
End: 2023-12-18
Payer: MEDICARE

## 2023-12-18 DIAGNOSIS — M25.511 CHRONIC RIGHT SHOULDER PAIN: ICD-10-CM

## 2023-12-18 DIAGNOSIS — G89.29 CHRONIC RIGHT SHOULDER PAIN: ICD-10-CM

## 2023-12-18 PROCEDURE — 73221 MRI JOINT UPR EXTREM W/O DYE: CPT | Mod: RT

## 2023-12-18 PROCEDURE — 73221 MRI JOINT UPR EXTREM W/O DYE: CPT | Mod: RIGHT SIDE | Performed by: STUDENT IN AN ORGANIZED HEALTH CARE EDUCATION/TRAINING PROGRAM

## 2023-12-20 ENCOUNTER — OFFICE VISIT (OUTPATIENT)
Dept: ORTHOPEDIC SURGERY | Facility: CLINIC | Age: 68
End: 2023-12-20
Payer: MEDICARE

## 2023-12-20 VITALS — BODY MASS INDEX: 23.04 KG/M2 | WEIGHT: 121.91 LBS

## 2023-12-20 DIAGNOSIS — M06.9 RHEUMATOID ARTHRITIS, INVOLVING UNSPECIFIED SITE, UNSPECIFIED WHETHER RHEUMATOID FACTOR PRESENT (MULTI): ICD-10-CM

## 2023-12-20 DIAGNOSIS — M25.562 BILATERAL CHRONIC KNEE PAIN: Primary | ICD-10-CM

## 2023-12-20 DIAGNOSIS — M17.12 PRIMARY OSTEOARTHRITIS OF LEFT KNEE: ICD-10-CM

## 2023-12-20 DIAGNOSIS — G89.29 BILATERAL CHRONIC KNEE PAIN: Primary | ICD-10-CM

## 2023-12-20 DIAGNOSIS — M17.11 PRIMARY OSTEOARTHRITIS OF RIGHT KNEE: ICD-10-CM

## 2023-12-20 DIAGNOSIS — M25.561 BILATERAL CHRONIC KNEE PAIN: Primary | ICD-10-CM

## 2023-12-20 PROCEDURE — 2500000005 HC RX 250 GENERAL PHARMACY W/O HCPCS: Performed by: PHYSICIAN ASSISTANT

## 2023-12-20 PROCEDURE — 1125F AMNT PAIN NOTED PAIN PRSNT: CPT | Performed by: PHYSICIAN ASSISTANT

## 2023-12-20 PROCEDURE — 2500000004 HC RX 250 GENERAL PHARMACY W/ HCPCS (ALT 636 FOR OP/ED): Performed by: PHYSICIAN ASSISTANT

## 2023-12-20 PROCEDURE — 99213 OFFICE O/P EST LOW 20 MIN: CPT | Performed by: PHYSICIAN ASSISTANT

## 2023-12-20 PROCEDURE — 1036F TOBACCO NON-USER: CPT | Performed by: PHYSICIAN ASSISTANT

## 2023-12-20 PROCEDURE — 1159F MED LIST DOCD IN RCRD: CPT | Performed by: PHYSICIAN ASSISTANT

## 2023-12-20 PROCEDURE — 20610 DRAIN/INJ JOINT/BURSA W/O US: CPT | Performed by: PHYSICIAN ASSISTANT

## 2023-12-20 PROCEDURE — 1160F RVW MEDS BY RX/DR IN RCRD: CPT | Performed by: PHYSICIAN ASSISTANT

## 2023-12-20 RX ORDER — LIDOCAINE HYDROCHLORIDE 10 MG/ML
1 INJECTION INFILTRATION; PERINEURAL
Status: COMPLETED | OUTPATIENT
Start: 2023-12-20 | End: 2023-12-20

## 2023-12-20 RX ORDER — TRIAMCINOLONE ACETONIDE 40 MG/ML
40 INJECTION, SUSPENSION INTRA-ARTICULAR; INTRAMUSCULAR
Status: COMPLETED | OUTPATIENT
Start: 2023-12-20 | End: 2023-12-20

## 2023-12-20 RX ADMIN — LIDOCAINE HYDROCHLORIDE 1 ML: 10 INJECTION, SOLUTION INFILTRATION; PERINEURAL at 13:14

## 2023-12-20 RX ADMIN — TRIAMCINOLONE ACETONIDE 40 MG: 40 INJECTION, SUSPENSION INTRA-ARTICULAR; INTRAMUSCULAR at 13:14

## 2023-12-20 ASSESSMENT — PAIN DESCRIPTION - DESCRIPTORS: DESCRIPTORS: ACHING;SHARP

## 2023-12-20 ASSESSMENT — PAIN SCALES - GENERAL: PAINLEVEL_OUTOF10: 7

## 2023-12-20 ASSESSMENT — LIFESTYLE VARIABLES: TOTAL SCORE: 0

## 2023-12-20 ASSESSMENT — ENCOUNTER SYMPTOMS
OCCASIONAL FEELINGS OF UNSTEADINESS: 0
LOSS OF SENSATION IN FEET: 0
DEPRESSION: 0

## 2023-12-20 ASSESSMENT — PATIENT HEALTH QUESTIONNAIRE - PHQ9
2. FEELING DOWN, DEPRESSED OR HOPELESS: NOT AT ALL
1. LITTLE INTEREST OR PLEASURE IN DOING THINGS: NOT AT ALL
SUM OF ALL RESPONSES TO PHQ9 QUESTIONS 1 AND 2: 0

## 2023-12-20 ASSESSMENT — PAIN - FUNCTIONAL ASSESSMENT: PAIN_FUNCTIONAL_ASSESSMENT: 0-10

## 2023-12-20 NOTE — PATIENT INSTRUCTIONS
Thank you for coming to see us today!     Continue to use tylenol for pain control.   Rest, ice and elevate and remember to do exercises.   Follow up with Dr. Delgado for your shoulder  We gave you a referral for rheumatology      Follow up in 6 weeks if the cortisone does not help pain to apply for visco supplementation injections

## 2023-12-20 NOTE — PROGRESS NOTES
Subjective      Chief Complaint   Patient presents with    Left Knee - Pain    Right Knee - Pain        No surgery found     HPI  This 68 year young woman presents today with right and left knee pain (8/10). She states that his right and left knee pain has been worsening and persistent for the past for months. She states that she had good relief of the right knee pain from previous cortisone injections. She denies trauma or injury to the right or left knee. She states that the right and left knee pain is worse with and aggravated by prolonged walking, bending and standing. She states that this right and left knee pain impairs her ability to complete her normal activities of daily living. Patient is following with Dr. Delgado for right shoulder pain. She had an MRI of the right shoulder to r/o rotator cuff tear. She states that she was diagnosed with RA 6 months ago and is looking to establish care with Rheumatology. She states that she has multiple body aches, including shoulders, hips and knees. She presents today for a discussion of further options.    CARDIOLOGY:   Negative for chest pain, shortness of breath.   RESPIRATORY:   Negative for chest pain, shortness of breath.   MUSCULOSKELETAL:   See HPI for details.   NEUROLOGY:   Negative for tingling, numbness, weakness.    Objective    There were no vitals filed for this visit.    Physical Exam  GENERAL:          General Appearance:  This is a pleasant patient with appropriate affect, in no acute distress.   DERMATOLOGY:          Skin: skin at the neck, upper and lower back, and trunk is intact. There is no evidence of skin rash, skin breakdown or ulceration, or atrophic skin change.   EXTREMITIES:          Vascular:  Right, left hands and feet are warm with good color and pulses. Right and left calf and thigh are nontender and nonswollen.   NEUROLOGICAL:          Orientation:  Patient is alert and oriented to person, place, time and situation. Right and left  upper and lower extremity motor and sensory examinations are intact.      MUSCULOSKELETAL: Neck: Nontender. No pain with range of motion. Back: No tenderness. Straight leg test negative bilaterally. Right and left hips: Nontender. No pain or limitation with ROM. Left and Right knee: There is tenderness over the medial compartment of the right and left knee. There is pain with but no limitation of ROM of the right or left knee. McMurrays is negative. Anterior drawer and lachmans are negative. No effusion present. Compartments are soft. No tenderness in the right or left lower extrremity. Distal pulses are palpable. Neruovascular is intact. Standing AP x-rays of the left and right knee and lateral of the right done and read in the office on 5-5-2023 show that there is mild osteoarthritis of the right and left knee with loss of joint surface cartilage and sclerosis and are reviewed with the patient in the office today.    MR shoulder right wo IV contrast    Result Date: 12/18/2023  Interpreted By:  Talib Davis and Marshall Colin STUDY: MRI of the  right shoulder without IV contrast;  12/18/2023 1:36 pm   INDICATION: Signs/Symptoms:EVAL CUFF TEAR.   COMPARISON: Right shoulder radiographs dated 12/15/2023.   ACCESSION NUMBER(S): [id number]   ORDERING CLINICIAN: JUAN LIM   TECHNIQUE: MR imaging of the  right shoulder was obtained  without IV contrast.   FINDINGS: ROTATOR CUFF TENDONS: Mild supraspinatus and infraspinatus tendinosis with hyperintense intrasubstance signal and thickening. There is minimal low-grade bursal sided fraying of the insertional supraspinatus tendon without high-grade or full-thickness tear. Otherwise, the supraspinatus, infraspinatus, subscapularis, and teres minor tendons are intact. Mild diffuse rotator cuff muscle atrophy.   BICEPS TENDON AND ROTATOR INTERVAL: The intra-articular long head biceps tendon is intact and has a normal course. The rotator interval is unremarkable.    JOINTS: Mild acromioclavicular joint osteoarthrosis with cartilage loss and osteophytosis.   Mild glenohumeral joint osteoarthrosis with mild diffuse cartilage thinning and osteophytosis.   Moderate volume subacromial subdeltoid bursal fluid. No joint effusion.   LABRUM: Circumferential degenerative truncation of the glenoid labrum.   OSSEOUS STRUCTURES: No focal marrow replacing lesions are identified. There is no fracture.   SOFT TISSUES: The suprascapular nerve is intact at the suprascapular and spinoglenoid notches.       1. Mild supraspinatus and infraspinatus tendinosis with minimal bursal sided fraying of the insertional supraspinatus tendon. 2. Moderate volume subacromial subdeltoid bursal fluid may reflect bursitis. 3. Mild acromioclavicular and glenohumeral joint osteoarthrosis.     I personally reviewed the images/study and I agree with the findings as stated. This study was interpreted at [place], [place].   Signed by: Talib Davis 12/18/2023 3:59 PM Dictation workstation:   [id number]    XR shoulder 2+ views bilateral    Result Date: 12/16/2023  Interpreted By:  Godfrey Mcnamara, STUDY: XR SHOULDER 2+ VIEWS BILATERAL;  12/15/2023 11:03 am   INDICATION: Signs/Symptoms:PAIN.   COMPARISON: none   ACCESSION NUMBER(S): [id number]   ORDERING CLINICIAN: JUAN LIM   FINDINGS: Three views bilateral shoulders.   Bilateral Mild acromioclavicular osteoarthrosis. Bilateral glenohumeral joint are intact. No fracture or dislocation. No osseous lesion. Soft tissues intact   IMPRESSION Mild bilateral acromioclavicular osteoarthrosis. The glenohumeral joints appear intact.     MACRO none   Signed by: Godfrey Mcnamara 12/16/2023 10:07 AM Dictation workstation:   [id number]     Patient ID: Noé Valente is a 68 y.o. female.    L Inj/Asp: bilateral knee on 12/20/2023 1:14 PM  Indications: pain  Details: 22 G needle, medial approach  Medications (Right): 40 mg triamcinolone  acetonide 40 mg/mL; 1 mL lidocaine 10 mg/mL (1 %)  Medications (Left): 40 mg triamcinolone acetonide 40 mg/mL; 1 mL lidocaine 10 mg/mL (1 %)  Outcome: tolerated well, no immediate complications  Procedure, treatment alternatives, risks and benefits explained, specific risks discussed. Immediately prior to procedure a time out was called to verify the correct patient, procedure, equipment, support staff and site/side marked as required. Patient was prepped and draped in the usual sterile fashion.         Noé was seen today for pain and pain.  Diagnoses and all orders for this visit:  Bilateral chronic knee pain (Primary)  Primary osteoarthritis of right knee  Primary osteoarthritis of left knee   Options are discussed with the patient in detail.  The patient is given a referral to rheumatology for further evaluation and treatment of her rheumatoid arthritis.  The patient is instructed to follow-up with Dr. Delgado for further evaluation and treatment of her right shoulder.  The patient is instructed regarding activity modification, ice, physician assistant directed at home gentle strengthening and ROM exercises, and the appropriate use of Tylenol as needed for pain with its potential adverse reactions and side effects. The patient understands. The patient states that despite all the treatment listed above that this left and right knee pain is debilitating and requests a discussion of further options. Cortisone injection to the left and right knee is discussed in the office today. This is done in the office today. See procedures below. Return in 6 weeks for reevaluation and consider viscosupplementation injections or sooner as needed.    Anupama Spence PA-C

## 2023-12-27 ENCOUNTER — APPOINTMENT (OUTPATIENT)
Dept: ORTHOPEDIC SURGERY | Facility: HOSPITAL | Age: 68
End: 2023-12-27
Payer: MEDICARE

## 2024-01-24 ENCOUNTER — OFFICE VISIT (OUTPATIENT)
Dept: PRIMARY CARE | Facility: CLINIC | Age: 69
End: 2024-01-24
Payer: MEDICARE

## 2024-01-24 ENCOUNTER — LAB (OUTPATIENT)
Dept: LAB | Facility: LAB | Age: 69
End: 2024-01-24
Payer: MEDICARE

## 2024-01-24 VITALS
SYSTOLIC BLOOD PRESSURE: 128 MMHG | WEIGHT: 120 LBS | HEIGHT: 61 IN | BODY MASS INDEX: 22.66 KG/M2 | TEMPERATURE: 97.6 F | DIASTOLIC BLOOD PRESSURE: 83 MMHG | HEART RATE: 73 BPM

## 2024-01-24 DIAGNOSIS — Z76.89 ENCOUNTER TO ESTABLISH CARE: ICD-10-CM

## 2024-01-24 DIAGNOSIS — Z13.1 SCREENING FOR DIABETES MELLITUS: ICD-10-CM

## 2024-01-24 DIAGNOSIS — Z13.6 SCREENING FOR CARDIOVASCULAR CONDITION: ICD-10-CM

## 2024-01-24 DIAGNOSIS — R73.9 HYPERGLYCEMIA: ICD-10-CM

## 2024-01-24 DIAGNOSIS — M05.9 SEROPOSITIVE RHEUMATOID ARTHRITIS (MULTI): ICD-10-CM

## 2024-01-24 DIAGNOSIS — E78.2 MIXED HYPERLIPIDEMIA: ICD-10-CM

## 2024-01-24 DIAGNOSIS — Z00.00 ANNUAL PHYSICAL EXAM: Primary | ICD-10-CM

## 2024-01-24 DIAGNOSIS — E55.9 VITAMIN D DEFICIENCY: ICD-10-CM

## 2024-01-24 DIAGNOSIS — J45.901 ASTHMATIC BRONCHITIS WITH ACUTE EXACERBATION, UNSPECIFIED ASTHMA SEVERITY, UNSPECIFIED WHETHER PERSISTENT (HHS-HCC): ICD-10-CM

## 2024-01-24 DIAGNOSIS — Z00.00 ANNUAL PHYSICAL EXAM: ICD-10-CM

## 2024-01-24 DIAGNOSIS — R91.1 LUNG NODULE: ICD-10-CM

## 2024-01-24 PROBLEM — S83.91XA SPRAIN OF RIGHT KNEE: Status: RESOLVED | Noted: 2023-09-08 | Resolved: 2024-01-24

## 2024-01-24 PROBLEM — R59.9 LYMPH NODES ENLARGED: Status: RESOLVED | Noted: 2023-09-08 | Resolved: 2024-01-24

## 2024-01-24 PROBLEM — M54.2 NECK PAIN: Status: RESOLVED | Noted: 2023-09-08 | Resolved: 2024-01-24

## 2024-01-24 PROBLEM — R06.2 WHEEZING: Status: RESOLVED | Noted: 2023-09-08 | Resolved: 2024-01-24

## 2024-01-24 PROBLEM — N95.2 POSTMENOPAUSAL ATROPHIC VAGINITIS: Status: RESOLVED | Noted: 2023-09-08 | Resolved: 2024-01-24

## 2024-01-24 PROBLEM — H61.23 IMPACTED CERUMEN OF BOTH EARS: Status: RESOLVED | Noted: 2023-09-08 | Resolved: 2024-01-24

## 2024-01-24 PROBLEM — G47.00 INSOMNIA: Status: RESOLVED | Noted: 2023-09-08 | Resolved: 2024-01-24

## 2024-01-24 PROBLEM — M25.519 SHOULDER JOINT PAIN: Status: RESOLVED | Noted: 2023-09-08 | Resolved: 2024-01-24

## 2024-01-24 PROBLEM — M25.561 PAIN IN RIGHT KNEE: Status: RESOLVED | Noted: 2023-09-08 | Resolved: 2024-01-24

## 2024-01-24 PROBLEM — J45.909 ASTHMATIC BRONCHITIS, UNSPECIFIED ASTHMA SEVERITY, UNCOMPLICATED (HHS-HCC): Status: RESOLVED | Noted: 2023-09-08 | Resolved: 2024-01-24

## 2024-01-24 PROBLEM — S16.1XXA CERVICAL STRAIN: Status: RESOLVED | Noted: 2023-09-08 | Resolved: 2024-01-24

## 2024-01-24 PROBLEM — M85.89 OSTEOPENIA OF MULTIPLE SITES: Status: ACTIVE | Noted: 2024-01-24

## 2024-01-24 PROBLEM — H61.20 WAX IN EAR: Status: RESOLVED | Noted: 2023-09-08 | Resolved: 2024-01-24

## 2024-01-24 PROBLEM — N95.1 MENOPAUSAL SYMPTOM: Status: RESOLVED | Noted: 2023-09-08 | Resolved: 2024-01-24

## 2024-01-24 PROBLEM — N95.1 FEMALE CLIMACTERIC STATE: Status: RESOLVED | Noted: 2023-09-08 | Resolved: 2024-01-24

## 2024-01-24 LAB
25(OH)D3 SERPL-MCNC: 71 NG/ML (ref 30–100)
ALBUMIN SERPL BCP-MCNC: 4.2 G/DL (ref 3.4–5)
ALP SERPL-CCNC: 81 U/L (ref 33–136)
ALT SERPL W P-5'-P-CCNC: 33 U/L (ref 7–45)
ANION GAP SERPL CALC-SCNC: 12 MMOL/L (ref 10–20)
AST SERPL W P-5'-P-CCNC: 27 U/L (ref 9–39)
BILIRUB SERPL-MCNC: 0.5 MG/DL (ref 0–1.2)
BUN SERPL-MCNC: 5 MG/DL (ref 6–23)
CALCIUM SERPL-MCNC: 9.5 MG/DL (ref 8.6–10.6)
CHLORIDE SERPL-SCNC: 103 MMOL/L (ref 98–107)
CHOLEST SERPL-MCNC: 141 MG/DL (ref 0–199)
CHOLESTEROL/HDL RATIO: 2.8
CO2 SERPL-SCNC: 29 MMOL/L (ref 21–32)
CREAT SERPL-MCNC: 0.48 MG/DL (ref 0.5–1.05)
EGFRCR SERPLBLD CKD-EPI 2021: >90 ML/MIN/1.73M*2
ERYTHROCYTE [DISTWIDTH] IN BLOOD BY AUTOMATED COUNT: 13.6 % (ref 11.5–14.5)
EST. AVERAGE GLUCOSE BLD GHB EST-MCNC: 120 MG/DL
GLUCOSE SERPL-MCNC: 85 MG/DL (ref 74–99)
HBA1C MFR BLD: 5.8 %
HCT VFR BLD AUTO: 37.6 % (ref 36–46)
HDLC SERPL-MCNC: 50.8 MG/DL
HGB BLD-MCNC: 12 G/DL (ref 12–16)
LDLC SERPL CALC-MCNC: 58 MG/DL
MCH RBC QN AUTO: 29.9 PG (ref 26–34)
MCHC RBC AUTO-ENTMCNC: 31.9 G/DL (ref 32–36)
MCV RBC AUTO: 94 FL (ref 80–100)
NON HDL CHOLESTEROL: 90 MG/DL (ref 0–149)
NRBC BLD-RTO: 0 /100 WBCS (ref 0–0)
PLATELET # BLD AUTO: 288 X10*3/UL (ref 150–450)
POTASSIUM SERPL-SCNC: 4.1 MMOL/L (ref 3.5–5.3)
PROT SERPL-MCNC: 7.1 G/DL (ref 6.4–8.2)
RBC # BLD AUTO: 4.01 X10*6/UL (ref 4–5.2)
SODIUM SERPL-SCNC: 140 MMOL/L (ref 136–145)
TRIGL SERPL-MCNC: 162 MG/DL (ref 0–149)
VLDL: 32 MG/DL (ref 0–40)
WBC # BLD AUTO: 6.6 X10*3/UL (ref 4.4–11.3)

## 2024-01-24 PROCEDURE — 36415 COLL VENOUS BLD VENIPUNCTURE: CPT

## 2024-01-24 PROCEDURE — 1160F RVW MEDS BY RX/DR IN RCRD: CPT | Performed by: INTERNAL MEDICINE

## 2024-01-24 PROCEDURE — 1170F FXNL STATUS ASSESSED: CPT | Performed by: INTERNAL MEDICINE

## 2024-01-24 PROCEDURE — G0439 PPPS, SUBSEQ VISIT: HCPCS | Performed by: INTERNAL MEDICINE

## 2024-01-24 PROCEDURE — 83036 HEMOGLOBIN GLYCOSYLATED A1C: CPT

## 2024-01-24 PROCEDURE — 82306 VITAMIN D 25 HYDROXY: CPT

## 2024-01-24 PROCEDURE — 80053 COMPREHEN METABOLIC PANEL: CPT

## 2024-01-24 PROCEDURE — 85027 COMPLETE CBC AUTOMATED: CPT

## 2024-01-24 PROCEDURE — 1125F AMNT PAIN NOTED PAIN PRSNT: CPT | Performed by: INTERNAL MEDICINE

## 2024-01-24 PROCEDURE — 99213 OFFICE O/P EST LOW 20 MIN: CPT | Performed by: INTERNAL MEDICINE

## 2024-01-24 PROCEDURE — 1036F TOBACCO NON-USER: CPT | Performed by: INTERNAL MEDICINE

## 2024-01-24 PROCEDURE — 80061 LIPID PANEL: CPT

## 2024-01-24 PROCEDURE — 1159F MED LIST DOCD IN RCRD: CPT | Performed by: INTERNAL MEDICINE

## 2024-01-24 RX ORDER — METHOTREXATE 2.5 MG/1
TABLET ORAL
Qty: 84 TABLET | Refills: 0
Start: 2024-01-24 | End: 2024-02-01 | Stop reason: SDUPTHER

## 2024-01-24 RX ORDER — DOXYCYCLINE 100 MG/1
100 CAPSULE ORAL 2 TIMES DAILY
Qty: 14 CAPSULE | Refills: 0 | Status: SHIPPED | OUTPATIENT
Start: 2024-01-24 | End: 2024-01-31

## 2024-01-24 RX ORDER — ATORVASTATIN CALCIUM 20 MG/1
20 TABLET, FILM COATED ORAL DAILY
Qty: 90 TABLET | Refills: 3 | Status: SHIPPED | OUTPATIENT
Start: 2024-01-24 | End: 2025-01-23

## 2024-01-24 ASSESSMENT — ENCOUNTER SYMPTOMS
CHILLS: 0
PALPITATIONS: 0
COUGH: 1
CHOKING: 0
FEVER: 0
LOSS OF SENSATION IN FEET: 0
POLYDIPSIA: 0
OCCASIONAL FEELINGS OF UNSTEADINESS: 0
STRIDOR: 0
DEPRESSION: 0
WHEEZING: 0
SHORTNESS OF BREATH: 0
CHEST TIGHTNESS: 0

## 2024-01-24 ASSESSMENT — ACTIVITIES OF DAILY LIVING (ADL)
TAKING_MEDICATION: INDEPENDENT
MANAGING_FINANCES: INDEPENDENT
GROCERY_SHOPPING: INDEPENDENT
BATHING: INDEPENDENT
DRESSING: INDEPENDENT
DOING_HOUSEWORK: INDEPENDENT

## 2024-01-24 NOTE — PROGRESS NOTES
"Subjective   Patient ID: Noé Valente is a 68 y.o. female who presents for No chief complaint on file..    Patient presents today for an annual wellness exam    Medical history and surgical history updated today  Medication list reconciled and updated  Patient denies vision issues at this time: regular eye exams.   Patient denies hearing issues at this time: denies  Current diet:  Current exercise habit:  Follows with a dentist: Yes    Patient counseled about available preventative health vaccinations and provided with opportunity to update them with our office or through prescription to preferred pharmacy. Declines all vaccines today and this year. She will reconsider for future.     Reviewed and discussed preventative health screening recommendations for colon cancer: 2015- 10 year clearance. Due again 2025    Reviewed and discussed preventative health recommendations for screening for cervical cancer and breast cancer: mammogram due- pt reports it is scheduled for Feb.          Review of Systems   Constitutional:  Negative for chills and fever.   Respiratory:  Positive for cough. Negative for choking, chest tightness, shortness of breath, wheezing and stridor.    Cardiovascular:  Negative for chest pain and palpitations.   Endocrine: Negative for polydipsia and polyuria.       Objective   /83   Pulse 73   Temp 36.4 °C (97.6 °F)   Ht 1.549 m (5' 1\")   Wt 54.4 kg (120 lb)   BMI 22.67 kg/m²     Physical Exam  Constitutional:       Appearance: Normal appearance.   HENT:      Head: Normocephalic and atraumatic.      Right Ear: Tympanic membrane normal.      Left Ear: Tympanic membrane normal.      Nose: Nose normal.      Mouth/Throat:      Mouth: Mucous membranes are moist.   Eyes:      Extraocular Movements: Extraocular movements intact.      Conjunctiva/sclera: Conjunctivae normal.      Pupils: Pupils are equal, round, and reactive to light.   Neck:      Thyroid: No thyroid mass, thyromegaly or thyroid " tenderness.      Vascular: No carotid bruit.   Cardiovascular:      Rate and Rhythm: Normal rate and regular rhythm.      Heart sounds: No murmur heard.     No friction rub. No gallop.   Pulmonary:      Effort: Pulmonary effort is normal. No respiratory distress.      Breath sounds: No wheezing, rhonchi or rales.   Abdominal:      General: Bowel sounds are normal.      Palpations: Abdomen is soft. There is no mass.      Tenderness: There is no abdominal tenderness. There is no guarding or rebound.      Hernia: No hernia is present.   Musculoskeletal:      Cervical back: Neck supple. No tenderness.      Right lower leg: No edema.      Left lower leg: No edema.   Lymphadenopathy:      Cervical: No cervical adenopathy.   Skin:     Coloration: Skin is not jaundiced.   Neurological:      General: No focal deficit present.      Mental Status: She is alert and oriented to person, place, and time.   Psychiatric:         Mood and Affect: Mood normal.         Assessment/Plan   Problem List Items Addressed This Visit             ICD-10-CM    Hyperlipidemia E78.5    Relevant Medications    atorvastatin (Lipitor) 20 mg tablet    Other Relevant Orders    Lipid Panel    CBC    Comprehensive Metabolic Panel    Hemoglobin A1C    Vitamin D 25-Hydroxy,Total (for eval of Vitamin D levels)    Lung nodule R91.1    Relevant Orders    CT chest wo IV contrast     Other Visit Diagnoses         Codes    Annual physical exam    -  Primary Z00.00    Relevant Orders    Lipid Panel    CBC    Comprehensive Metabolic Panel    Hemoglobin A1C    Vitamin D 25-Hydroxy,Total (for eval of Vitamin D levels)    Encounter to establish care     Z76.89    Relevant Orders    Lipid Panel    CBC    Comprehensive Metabolic Panel    Hemoglobin A1C    Vitamin D 25-Hydroxy,Total (for eval of Vitamin D levels)    Seropositive rheumatoid arthritis (CMS/[facility])     M05.9    Relevant Medications    methotrexate (Trexall) 2.5 mg tablet    Other Relevant Orders    Lipid  Panel    CBC    Comprehensive Metabolic Panel    Hemoglobin A1C    Vitamin D 25-Hydroxy,Total (for eval of Vitamin D levels)    Asthmatic bronchitis with acute exacerbation, unspecified asthma severity, unspecified whether persistent     J45.901    Relevant Medications    doxycycline (Vibramycin) 100 mg capsule    Screening for diabetes mellitus     Z13.1    Relevant Orders    Lipid Panel    CBC    Comprehensive Metabolic Panel    Hemoglobin A1C    Vitamin D 25-Hydroxy,Total (for eval of Vitamin D levels)    Screening for cardiovascular condition     Z13.6    Relevant Orders    Lipid Panel    CBC    Comprehensive Metabolic Panel    Hemoglobin A1C    Vitamin D 25-Hydroxy,Total (for eval of Vitamin D levels)    Vitamin D deficiency     E55.9    Relevant Orders    Lipid Panel    CBC    Comprehensive Metabolic Panel    Hemoglobin A1C    Vitamin D 25-Hydroxy,Total (for eval of Vitamin D levels)    Hyperglycemia     R73.9    Relevant Orders    Hemoglobin A1C

## 2024-02-01 ENCOUNTER — LAB (OUTPATIENT)
Dept: LAB | Facility: LAB | Age: 69
End: 2024-02-01
Payer: MEDICARE

## 2024-02-01 ENCOUNTER — OFFICE VISIT (OUTPATIENT)
Dept: RHEUMATOLOGY | Facility: CLINIC | Age: 69
End: 2024-02-01
Payer: MEDICARE

## 2024-02-01 VITALS
HEART RATE: 79 BPM | DIASTOLIC BLOOD PRESSURE: 77 MMHG | WEIGHT: 118 LBS | BODY MASS INDEX: 22.28 KG/M2 | HEIGHT: 61 IN | SYSTOLIC BLOOD PRESSURE: 108 MMHG

## 2024-02-01 DIAGNOSIS — M05.9 RHEUMATOID ARTHRITIS WITH RHEUMATOID FACTOR, UNSPECIFIED (MULTI): ICD-10-CM

## 2024-02-01 DIAGNOSIS — M05.9 SEROPOSITIVE RHEUMATOID ARTHRITIS (MULTI): ICD-10-CM

## 2024-02-01 DIAGNOSIS — M05.9 RHEUMATOID ARTHRITIS WITH POSITIVE RHEUMATOID FACTOR, INVOLVING UNSPECIFIED SITE (MULTI): Primary | ICD-10-CM

## 2024-02-01 DIAGNOSIS — M05.9 RHEUMATOID ARTHRITIS WITH POSITIVE RHEUMATOID FACTOR, INVOLVING UNSPECIFIED SITE (MULTI): ICD-10-CM

## 2024-02-01 LAB
ALBUMIN SERPL BCP-MCNC: 4.3 G/DL (ref 3.4–5)
ALP SERPL-CCNC: 75 U/L (ref 33–136)
ALT SERPL W P-5'-P-CCNC: 42 U/L (ref 7–45)
AST SERPL W P-5'-P-CCNC: 28 U/L (ref 9–39)
BILIRUB DIRECT SERPL-MCNC: 0.1 MG/DL (ref 0–0.3)
BILIRUB SERPL-MCNC: 0.7 MG/DL (ref 0–1.2)
CREAT SERPL-MCNC: 0.45 MG/DL (ref 0.5–1.05)
CRP SERPL-MCNC: <0.1 MG/DL
EGFRCR SERPLBLD CKD-EPI 2021: >90 ML/MIN/1.73M*2
ERYTHROCYTE [SEDIMENTATION RATE] IN BLOOD BY WESTERGREN METHOD: 22 MM/H (ref 0–30)
PROT SERPL-MCNC: 6.7 G/DL (ref 6.4–8.2)

## 2024-02-01 PROCEDURE — 1160F RVW MEDS BY RX/DR IN RCRD: CPT | Performed by: INTERNAL MEDICINE

## 2024-02-01 PROCEDURE — 85652 RBC SED RATE AUTOMATED: CPT

## 2024-02-01 PROCEDURE — 86140 C-REACTIVE PROTEIN: CPT

## 2024-02-01 PROCEDURE — 99214 OFFICE O/P EST MOD 30 MIN: CPT | Performed by: INTERNAL MEDICINE

## 2024-02-01 PROCEDURE — 1036F TOBACCO NON-USER: CPT | Performed by: INTERNAL MEDICINE

## 2024-02-01 PROCEDURE — 1125F AMNT PAIN NOTED PAIN PRSNT: CPT | Performed by: INTERNAL MEDICINE

## 2024-02-01 PROCEDURE — 1159F MED LIST DOCD IN RCRD: CPT | Performed by: INTERNAL MEDICINE

## 2024-02-01 PROCEDURE — 82565 ASSAY OF CREATININE: CPT

## 2024-02-01 PROCEDURE — 80076 HEPATIC FUNCTION PANEL: CPT

## 2024-02-01 PROCEDURE — 36415 COLL VENOUS BLD VENIPUNCTURE: CPT

## 2024-02-01 RX ORDER — FOLIC ACID 1 MG/1
1 TABLET ORAL DAILY
Qty: 90 TABLET | Refills: 0 | Status: SHIPPED | OUTPATIENT
Start: 2024-02-01

## 2024-02-01 RX ORDER — METHOTREXATE 2.5 MG/1
TABLET ORAL
Qty: 96 TABLET | Refills: 0 | Status: SHIPPED | OUTPATIENT
Start: 2024-02-01 | End: 2024-05-07

## 2024-02-01 NOTE — PROGRESS NOTES
Follow-up Rheumatology Patient Visit    Chief Complaint:  Noé Valente is a 68 y.o. female presenting today for Follow-up and Med Refill.    History of Presenting Problem:   68-year-old female with history of DJD of the cervical spine status post fusion presents with various joint complaint. Patient reported she started to have persistent joint complaints done in November 2021 mostly in her bilateral shoulders, it was initially attributed to her degenerative spine disease she underwent spinal surgery but symptoms have persisted and now she is having stiffness in her hands and knees she reports painful range of motion in her bilateral shoulder. And her wrists and hands. She reports occasional stiffness in her knees.  She has tried taking Aleve 2 pills twice a day which offers 50% relief.She is also on gabapentin  Today she reports about 90% improvement on current Regimen. She is tolerating MTX 8 pills weekly with folic acid.  She reports she is feeling so much better she does not need to take Celebrex she wants to hold off on starting Enbrel she has also changed her diet and is exercising more.  Her shoulder issues and also knee issues are improved      Problem List:   Patient Active Problem List   Diagnosis    Age-related osteoporosis without current pathological fracture    Allergic rhinitis    Chronic rhinitis    Hyperlipidemia    Bilateral chronic knee pain    Lung nodule    Osteoarthritis of cervical spine    Osteoarthritis of left knee    Osteoarthritis of right knee    Osteochondropathy    Rheumatoid arthritis (CMS/HCC)    Osteopenia of multiple sites       Past Medical History:   Past Medical History:   Diagnosis Date    Knee pain, bilateral     Personal history of nicotine dependence     History of nicotine dependence    Personal history of other diseases of the respiratory system     History of asthma    Personal history of other diseases of the respiratory system     History of allergic rhinitis     "Postmenopausal atrophic vaginitis 09/08/2023    Pulmonary fibrosis, unspecified (CMS/HCC)     Postinflammatory pulmonary fibrosis       Surgical History:   Past Surgical History:   Procedure Laterality Date    CERVICAL SPINE SURGERY      C5 fusion    SINUS SURGERY  06/10/2013    Sinus Surgery        Allergies:   Allergies   Allergen Reactions    Erythromycin Unknown    Ibuprofen Unknown    Sulfa (Sulfonamide Antibiotics) Unknown       Medications:   Current Outpatient Medications:     acetaminophen (Tylenol Extra Strength) 500 mg tablet, Take 1 tablet (500 mg) by mouth every 4 hours if needed., Disp: , Rfl:     albuterol 90 mcg/actuation inhaler, Inhale 1 puff if needed., Disp: , Rfl:     atorvastatin (Lipitor) 20 mg tablet, Take 1 tablet (20 mg) by mouth once daily., Disp: 90 tablet, Rfl: 3    azelastine HCl (AZELASTINE NASL), Administer 2 sprays into affected nostril(s) 2 times a day., Disp: , Rfl:     budesonide-formoteroL (Symbicort) 80-4.5 mcg/actuation inhaler, Inhale 2 puffs 2 times a day., Disp: , Rfl:     calcium carbonate (Oscal) 500 mg calcium (1,250 mg) tablet, Take 1 tablet (1,250 mg) by mouth 2 times a day., Disp: , Rfl:     cartilage/collagen/bor/hyalur ([employer] ORAL), Take by mouth. As directed, Disp: , Rfl:     cyanocobalamin (Vitamin B-12) 100 mcg tablet, once daily. As directed, Disp: , Rfl:     folic acid (Folvite) 1 mg tablet, Take 1 tablet (1 mg) by mouth once daily., Disp: 90 tablet, Rfl: 0    methotrexate (Trexall) 2.5 mg tablet, Take 7 tablets once weekly (3 tablets with breakfast and 4 tablets with dinner), Disp: 84 tablet, Rfl: 0    mv,calcium,min/iron/folic/vitK (MULTI FOR HER ORAL), Take by mouth once daily. As directed, Disp: , Rfl:       Objective   Physical Examination:    Visit Vitals  /77   Pulse 79   Ht 1.549 m (5' 1\")   Wt 53.5 kg (118 lb)   BMI 22.30 kg/m²   OB Status Postmenopausal   Smoking Status Former   BSA 1.52 m²      Gen: NAD, A&O x 3  HEENT: clear sclera and " conjunctiva,     Musculoskeletal:   Neck; WNL, full ROM  Shoulder: Pain with shoulder range of motion testing on the left she cannot AB duct past 75 degrees  Elbow:WNL, full ROM, no effusion noted  Wrist and fingers; improved swelling 2nd and 3rd MCP joints bilaterally minimal synovitis persistent , swelling noted in the wrists and PIP is resolved  Knees: No effusions or crepitation, full ROM.  Hips; WNL, full ROM, Negative Telly test  Ankle, Feet; WNL, full ROM    Skin: No rashes or lesions seen, no nail changes  Neuro: A&O x3, Normal Gait    Procedures :None    Orders:  No orders of the defined types were placed in this encounter.       Provider Impression:   Assessment/Plan   Encounter Diagnoses   Name Primary?    Rheumatoid arthritis with positive rheumatoid factor, involving unspecified site (CMS/HCC) Yes    Seropositive rheumatoid arthritis (CMS/HCC)     Rheumatoid arthritis with rheumatoid factor, unspecified (CMS/HCC)         68-year-old female with history of DJD of the cervical spine status post fusion presents with various joint complaint.On exam she has some inflammatory findings in her hands concerning for rheumatoid arthritis. Blood work shows positive rheumatoid factor and CCP  -Continue with methotrexate, continue with 8 pills weekly with folic acid daily . Hold on starting  Enbrel.  She feel she is doing better with just MTX  -Continue with Celebrex 200 mg daily as needed, recommend Pepcid for GI prophylaxis  -Check routine labs including TB and hepatitis screening  -Recommend patient follow-up with orthopedics regarding chronic shoulder pain  -Follow-up in 3 months

## 2024-02-03 ASSESSMENT — ENCOUNTER SYMPTOMS
ABDOMINAL PAIN: 0
CHEST TIGHTNESS: 0
DIZZINESS: 0
COLOR CHANGE: 0
DIFFICULTY URINATING: 0
HEADACHES: 0
ACTIVITY CHANGE: 0
JOINT SWELLING: 0
UNEXPECTED WEIGHT CHANGE: 0
DYSURIA: 0
ADENOPATHY: 0
FATIGUE: 0
WEAKNESS: 0
ABDOMINAL DISTENTION: 0
SHORTNESS OF BREATH: 0

## 2024-02-03 NOTE — PROGRESS NOTES
"Annual-menopause  Subjective   Noé Valente is a 68 y.o. female who is here for a routine exam.   Complaints:   denies vag bleed/discharge; denies pelvic pain, pressure, or bloating.  Last seen 2022= dexa  sgnf bmd loss  spine= -1.1  and now -2.0 /hip stable; began alendronate         Already on daily vit D and calcium supps; reports reg wt-bearing exercise most days; has not yet resumed wt training since Covid 3/2020; prev belonged to [employer].  PMHx: Osteopenia---on  meds since late    Asthma; High chol.  Rt shoulder pain= from cervical disc dz /tendonitis.       Rt neck pain.       Eczema.       Sinus infection.  Surg Hx: cervical discectomy   breast lift 50 yrs old  Father: , Heart Disease  Mother:   Last pap  10/10/2022-neg,hpv-neg   Mamm 2022-neg.   DEXA  2022 spine-2.0/hip-1.4   Periods : Menopausal age . Menarche 11.   NOT currently sexually active; no partner.   Total preg  1. Total live births  1. NVD  1.   Review of Systems   Constitutional:  Negative for activity change, fatigue and unexpected weight change.   Respiratory:  Negative for chest tightness and shortness of breath.    Cardiovascular:  Negative for chest pain and leg swelling.   Gastrointestinal:  Negative for abdominal distention and abdominal pain.   Genitourinary:  Negative for difficulty urinating, dysuria, genital sores, pelvic pain, vaginal bleeding, vaginal discharge and vaginal pain.   Musculoskeletal:  Negative for gait problem and joint swelling.   Skin:  Negative for color change and rash.   Neurological:  Negative for dizziness, weakness and headaches.   Hematological:  Negative for adenopathy.   Objective  Visit Vitals  /68   Ht 1.549 m (5' 1\")   Wt 55.2 kg (121 lb 12.8 oz)   BMI 23.01 kg/m²   OB Status Postmenopausal   Smoking Status Former   BSA 1.54 m²       General:   Alert and oriented, in no acute distress   Neck: Supple. No visible thyromegaly. Anterior horiz scar from " discectomy   Breast/Axilla: Normal to palpation bilaterally without masses, skin changes, or nipple discharge.    Abdomen: Soft, non-tender, without masses or organomegaly   Vulva: Normal architecture without erythema, masses, or lesions.    Vagina: Narrowed vault;  mucosa without lesions, masses.  Positive atrophy. No abnormal vaginal discharge.    Cervix: Normal without masses, lesions, or signs of cervicitis.    Uterus: Normal mobile, non-enlarged uterus    Adnexa: No palpable  masses or tenderness   Pelvic Floor No POP noted. No high tone pelvic floor    Psych  Rectal Normal affect. Normal mood.      Assessment/Plan   Encounter Diagnoses   Name Primary?    Female climacteric state; grossly nl breast/gyn exams. Yes    Encounter for screening mammogram for breast cancer     Postmenopausal atrophic vaginitis; asx     Age-related osteoporosis without current pathological fracture; refilled alendronate; cont calcium/vit D/exercise.     Colon cancer screening       Pilar Foss MD

## 2024-02-05 ENCOUNTER — OFFICE VISIT (OUTPATIENT)
Dept: OBSTETRICS AND GYNECOLOGY | Facility: CLINIC | Age: 69
End: 2024-02-05
Payer: MEDICARE

## 2024-02-05 ENCOUNTER — HOSPITAL ENCOUNTER (OUTPATIENT)
Dept: RADIOLOGY | Facility: CLINIC | Age: 69
Discharge: HOME | End: 2024-02-05
Payer: MEDICARE

## 2024-02-05 ENCOUNTER — APPOINTMENT (OUTPATIENT)
Dept: RADIOLOGY | Facility: HOSPITAL | Age: 69
End: 2024-02-05
Payer: MEDICARE

## 2024-02-05 VITALS
WEIGHT: 121.8 LBS | BODY MASS INDEX: 23 KG/M2 | HEIGHT: 61 IN | SYSTOLIC BLOOD PRESSURE: 102 MMHG | DIASTOLIC BLOOD PRESSURE: 68 MMHG

## 2024-02-05 DIAGNOSIS — Z12.11 COLON CANCER SCREENING: ICD-10-CM

## 2024-02-05 DIAGNOSIS — M81.0 AGE-RELATED OSTEOPOROSIS WITHOUT CURRENT PATHOLOGICAL FRACTURE: ICD-10-CM

## 2024-02-05 DIAGNOSIS — N95.1 FEMALE CLIMACTERIC STATE: Primary | ICD-10-CM

## 2024-02-05 DIAGNOSIS — Z12.31 ENCOUNTER FOR SCREENING MAMMOGRAM FOR BREAST CANCER: ICD-10-CM

## 2024-02-05 DIAGNOSIS — N95.2 POSTMENOPAUSAL ATROPHIC VAGINITIS: ICD-10-CM

## 2024-02-05 PROCEDURE — 1036F TOBACCO NON-USER: CPT | Performed by: OBSTETRICS & GYNECOLOGY

## 2024-02-05 PROCEDURE — 1159F MED LIST DOCD IN RCRD: CPT | Performed by: OBSTETRICS & GYNECOLOGY

## 2024-02-05 PROCEDURE — 77067 SCR MAMMO BI INCL CAD: CPT

## 2024-02-05 PROCEDURE — 1160F RVW MEDS BY RX/DR IN RCRD: CPT | Performed by: OBSTETRICS & GYNECOLOGY

## 2024-02-05 PROCEDURE — 99214 OFFICE O/P EST MOD 30 MIN: CPT | Performed by: OBSTETRICS & GYNECOLOGY

## 2024-02-05 PROCEDURE — 1126F AMNT PAIN NOTED NONE PRSNT: CPT | Performed by: OBSTETRICS & GYNECOLOGY

## 2024-02-05 RX ORDER — ALENDRONATE SODIUM 70 MG/1
70 TABLET ORAL
Qty: 12 TABLET | Refills: 3 | Status: SHIPPED | OUTPATIENT
Start: 2024-02-05 | End: 2025-02-04

## 2024-02-05 ASSESSMENT — LIFESTYLE VARIABLES
AUDIT-C TOTAL SCORE: 0
HOW OFTEN DO YOU HAVE SIX OR MORE DRINKS ON ONE OCCASION: NEVER
HOW MANY STANDARD DRINKS CONTAINING ALCOHOL DO YOU HAVE ON A TYPICAL DAY: PATIENT DOES NOT DRINK
SKIP TO QUESTIONS 9-10: 1
HOW OFTEN DO YOU HAVE A DRINK CONTAINING ALCOHOL: NEVER

## 2024-02-05 ASSESSMENT — PATIENT HEALTH QUESTIONNAIRE - PHQ9
2. FEELING DOWN, DEPRESSED OR HOPELESS: NOT AT ALL
1. LITTLE INTEREST OR PLEASURE IN DOING THINGS: NOT AT ALL
SUM OF ALL RESPONSES TO PHQ9 QUESTIONS 1 & 2: 0

## 2024-02-05 ASSESSMENT — PAIN SCALES - GENERAL: PAINLEVEL: 0-NO PAIN

## 2024-02-05 ASSESSMENT — ENCOUNTER SYMPTOMS
OCCASIONAL FEELINGS OF UNSTEADINESS: 0
DEPRESSION: 0
LOSS OF SENSATION IN FEET: 0

## 2024-02-12 ENCOUNTER — HOSPITAL ENCOUNTER (OUTPATIENT)
Dept: RADIOLOGY | Facility: CLINIC | Age: 69
Discharge: HOME | End: 2024-02-12
Payer: MEDICARE

## 2024-02-12 DIAGNOSIS — R91.1 LUNG NODULE: ICD-10-CM

## 2024-02-12 PROCEDURE — 71250 CT THORAX DX C-: CPT

## 2024-02-12 PROCEDURE — 71250 CT THORAX DX C-: CPT | Performed by: RADIOLOGY

## 2024-02-14 NOTE — RESULT ENCOUNTER NOTE
Verena, can you please contact Ms. Valente and schedule a nonurgent follow-up.  Her chest CT is negative for any lung nodule related issues however there were few small lymph nodes on the left chest wall and armpit as well as a small area of the liver that needs reassessed.  I would like to review these results with her in person and explained the follow-up testing I am advising.

## 2024-02-21 ENCOUNTER — OFFICE VISIT (OUTPATIENT)
Dept: PRIMARY CARE | Facility: CLINIC | Age: 69
End: 2024-02-21
Payer: MEDICARE

## 2024-02-21 VITALS
WEIGHT: 119.2 LBS | DIASTOLIC BLOOD PRESSURE: 80 MMHG | HEART RATE: 78 BPM | SYSTOLIC BLOOD PRESSURE: 111 MMHG | BODY MASS INDEX: 22.52 KG/M2 | TEMPERATURE: 97.8 F

## 2024-02-21 DIAGNOSIS — Z29.11 NEED FOR RSV IMMUNIZATION: ICD-10-CM

## 2024-02-21 DIAGNOSIS — R16.0 LIVER MASS: Primary | ICD-10-CM

## 2024-02-21 DIAGNOSIS — Z23 NEED FOR INFLUENZA VACCINATION: ICD-10-CM

## 2024-02-21 PROBLEM — M25.561 BILATERAL CHRONIC KNEE PAIN: Status: RESOLVED | Noted: 2023-09-08 | Resolved: 2024-02-21

## 2024-02-21 PROBLEM — G89.29 BILATERAL CHRONIC KNEE PAIN: Status: RESOLVED | Noted: 2023-09-08 | Resolved: 2024-02-21

## 2024-02-21 PROBLEM — Z12.11 COLON CANCER SCREENING: Status: RESOLVED | Noted: 2024-02-05 | Resolved: 2024-02-21

## 2024-02-21 PROBLEM — M25.562 BILATERAL CHRONIC KNEE PAIN: Status: RESOLVED | Noted: 2023-09-08 | Resolved: 2024-02-21

## 2024-02-21 PROBLEM — N95.2 POSTMENOPAUSAL ATROPHIC VAGINITIS: Status: RESOLVED | Noted: 2023-09-08 | Resolved: 2024-02-21

## 2024-02-21 PROBLEM — M17.12 OSTEOARTHRITIS OF LEFT KNEE: Status: RESOLVED | Noted: 2023-09-08 | Resolved: 2024-02-21

## 2024-02-21 PROBLEM — N95.1 FEMALE CLIMACTERIC STATE: Status: RESOLVED | Noted: 2023-09-08 | Resolved: 2024-02-21

## 2024-02-21 PROBLEM — M93.90 OSTEOCHONDROPATHY: Status: RESOLVED | Noted: 2023-09-08 | Resolved: 2024-02-21

## 2024-02-21 PROCEDURE — 1036F TOBACCO NON-USER: CPT | Performed by: INTERNAL MEDICINE

## 2024-02-21 PROCEDURE — 99213 OFFICE O/P EST LOW 20 MIN: CPT | Performed by: INTERNAL MEDICINE

## 2024-02-21 PROCEDURE — 1160F RVW MEDS BY RX/DR IN RCRD: CPT | Performed by: INTERNAL MEDICINE

## 2024-02-21 PROCEDURE — 1159F MED LIST DOCD IN RCRD: CPT | Performed by: INTERNAL MEDICINE

## 2024-02-21 PROCEDURE — G0008 ADMIN INFLUENZA VIRUS VAC: HCPCS | Performed by: INTERNAL MEDICINE

## 2024-02-21 PROCEDURE — 90662 IIV NO PRSV INCREASED AG IM: CPT | Performed by: INTERNAL MEDICINE

## 2024-02-21 PROCEDURE — 1126F AMNT PAIN NOTED NONE PRSNT: CPT | Performed by: INTERNAL MEDICINE

## 2024-02-21 ASSESSMENT — ENCOUNTER SYMPTOMS
PALPITATIONS: 0
FEVER: 0
CHILLS: 0
COUGH: 0
POLYDIPSIA: 0
SHORTNESS OF BREATH: 0

## 2024-02-21 ASSESSMENT — PAIN SCALES - GENERAL: PAINLEVEL: 0-NO PAIN

## 2024-02-21 NOTE — PROGRESS NOTES
Subjective   Patient ID: Noé Valente is a 68 y.o. female who presents for Follow-up (ON CT SCAN).    58-year-old male presents for follow-up counseling about her recent CAT scan.  She had a hypodense liver lesion she was counseled about the likely etiology and was advised about the opportunity to do a follow-up liver scan for further information.  Her hepatic panel is normal.  At the conclusion of our counseling is interested in doing the follow-up liver scan and I have ordered it for her to schedule at this time.    Mild borderline lymphadenopathy of the left axillary region with negative recent mammogram.  Patient did have recent respiratory symptoms but no hilar lymphadenopathy noted.  No palpable lymph nodes.  Conservative monitoring advised.  No B symptoms normal white counts         Review of Systems   Constitutional:  Negative for chills and fever.   Respiratory:  Negative for cough and shortness of breath.    Cardiovascular:  Negative for chest pain and palpitations.   Endocrine: Negative for polydipsia and polyuria.       Objective   /80   Pulse 78   Temp 36.6 °C (97.8 °F)   Wt 54.1 kg (119 lb 3.2 oz)   BMI 22.52 kg/m²     Physical Exam  Constitutional:       Appearance: Normal appearance.   HENT:      Head: Normocephalic and atraumatic.   Eyes:      Extraocular Movements: Extraocular movements intact.      Pupils: Pupils are equal, round, and reactive to light.   Neck:      Thyroid: No thyroid mass or thyromegaly.      Vascular: No carotid bruit.   Cardiovascular:      Rate and Rhythm: Normal rate and regular rhythm.      Heart sounds: No murmur heard.     No friction rub. No gallop.   Pulmonary:      Effort: No respiratory distress.      Breath sounds: No wheezing, rhonchi or rales.   Musculoskeletal:      Cervical back: Neck supple.      Right lower leg: No edema.      Left lower leg: No edema.   Lymphadenopathy:      Cervical: No cervical adenopathy.   Neurological:      Mental Status: She is  alert.         Assessment/Plan   Problem List Items Addressed This Visit    None  Visit Diagnoses         Codes    Liver mass    -  Primary R16.0    Relevant Orders    CT liver w IV contrast    Need for RSV immunization     Z29.11    Relevant Medications    respiratory syncytial virus, RSV, vaccine, adjuvanted, age 65y+ (AREXVY) 120 mcg/0.5 mL suspension for reconstitution    Need for influenza vaccination     Z23    Relevant Orders    Flu vaccine, quadrivalent, high-dose, preservative free, age 65y+ (FLUZONE)

## 2024-02-26 ENCOUNTER — HOSPITAL ENCOUNTER (OUTPATIENT)
Dept: RADIOLOGY | Facility: HOSPITAL | Age: 69
Discharge: HOME | End: 2024-02-26
Payer: MEDICARE

## 2024-02-26 ENCOUNTER — APPOINTMENT (OUTPATIENT)
Dept: RADIOLOGY | Facility: HOSPITAL | Age: 69
End: 2024-02-26
Payer: MEDICARE

## 2024-02-26 DIAGNOSIS — R16.0 LIVER MASS: ICD-10-CM

## 2024-02-26 PROCEDURE — 2550000001 HC RX 255 CONTRASTS: Performed by: INTERNAL MEDICINE

## 2024-02-26 PROCEDURE — 74160 CT ABDOMEN W/CONTRAST: CPT

## 2024-02-26 RX ADMIN — IOHEXOL 75 ML: 350 INJECTION, SOLUTION INTRAVENOUS at 12:22

## 2024-03-26 ENCOUNTER — APPOINTMENT (OUTPATIENT)
Dept: RHEUMATOLOGY | Facility: CLINIC | Age: 69
End: 2024-03-26
Payer: MEDICARE

## 2024-05-07 DIAGNOSIS — M05.9 SEROPOSITIVE RHEUMATOID ARTHRITIS (MULTI): ICD-10-CM

## 2024-05-07 RX ORDER — METHOTREXATE 2.5 MG/1
TABLET ORAL
Qty: 96 TABLET | Refills: 0 | Status: SHIPPED | OUTPATIENT
Start: 2024-05-07

## 2024-05-16 ENCOUNTER — OFFICE VISIT (OUTPATIENT)
Dept: RHEUMATOLOGY | Facility: CLINIC | Age: 69
End: 2024-05-16
Payer: MEDICARE

## 2024-05-16 ENCOUNTER — LAB (OUTPATIENT)
Dept: LAB | Facility: LAB | Age: 69
End: 2024-05-16
Payer: MEDICARE

## 2024-05-16 VITALS
DIASTOLIC BLOOD PRESSURE: 82 MMHG | HEART RATE: 85 BPM | SYSTOLIC BLOOD PRESSURE: 124 MMHG | BODY MASS INDEX: 22.75 KG/M2 | WEIGHT: 120.4 LBS

## 2024-05-16 DIAGNOSIS — M05.9 RHEUMATOID ARTHRITIS WITH POSITIVE RHEUMATOID FACTOR, INVOLVING UNSPECIFIED SITE (MULTI): ICD-10-CM

## 2024-05-16 DIAGNOSIS — M05.9 RHEUMATOID ARTHRITIS WITH POSITIVE RHEUMATOID FACTOR, INVOLVING UNSPECIFIED SITE (MULTI): Primary | ICD-10-CM

## 2024-05-16 DIAGNOSIS — M81.0 AGE-RELATED OSTEOPOROSIS WITHOUT CURRENT PATHOLOGICAL FRACTURE: ICD-10-CM

## 2024-05-16 LAB
ALBUMIN SERPL BCP-MCNC: 4.1 G/DL (ref 3.4–5)
ALP SERPL-CCNC: 80 U/L (ref 33–136)
ALT SERPL W P-5'-P-CCNC: 40 U/L (ref 7–45)
ANION GAP SERPL CALC-SCNC: 11 MMOL/L (ref 10–20)
AST SERPL W P-5'-P-CCNC: 36 U/L (ref 9–39)
BASOPHILS # BLD AUTO: 0.01 X10*3/UL (ref 0–0.1)
BASOPHILS NFR BLD AUTO: 0.2 %
BILIRUB SERPL-MCNC: 0.7 MG/DL (ref 0–1.2)
BUN SERPL-MCNC: 10 MG/DL (ref 6–23)
CALCIUM SERPL-MCNC: 9.2 MG/DL (ref 8.6–10.6)
CHLORIDE SERPL-SCNC: 100 MMOL/L (ref 98–107)
CO2 SERPL-SCNC: 31 MMOL/L (ref 21–32)
CREAT SERPL-MCNC: 0.42 MG/DL (ref 0.5–1.05)
CRP SERPL-MCNC: 1.19 MG/DL
EGFRCR SERPLBLD CKD-EPI 2021: >90 ML/MIN/1.73M*2
EOSINOPHIL # BLD AUTO: 0.17 X10*3/UL (ref 0–0.7)
EOSINOPHIL NFR BLD AUTO: 3.3 %
ERYTHROCYTE [DISTWIDTH] IN BLOOD BY AUTOMATED COUNT: 13.7 % (ref 11.5–14.5)
ERYTHROCYTE [SEDIMENTATION RATE] IN BLOOD BY WESTERGREN METHOD: 24 MM/H (ref 0–30)
GLUCOSE SERPL-MCNC: 106 MG/DL (ref 74–99)
HCT VFR BLD AUTO: 41 % (ref 36–46)
HGB BLD-MCNC: 13 G/DL (ref 12–16)
IMM GRANULOCYTES # BLD AUTO: 0.03 X10*3/UL (ref 0–0.7)
IMM GRANULOCYTES NFR BLD AUTO: 0.6 % (ref 0–0.9)
LYMPHOCYTES # BLD AUTO: 0.61 X10*3/UL (ref 1.2–4.8)
LYMPHOCYTES NFR BLD AUTO: 11.9 %
MCH RBC QN AUTO: 31.8 PG (ref 26–34)
MCHC RBC AUTO-ENTMCNC: 31.7 G/DL (ref 32–36)
MCV RBC AUTO: 100 FL (ref 80–100)
MONOCYTES # BLD AUTO: 0.23 X10*3/UL (ref 0.1–1)
MONOCYTES NFR BLD AUTO: 4.5 %
NEUTROPHILS # BLD AUTO: 4.06 X10*3/UL (ref 1.2–7.7)
NEUTROPHILS NFR BLD AUTO: 79.5 %
NRBC BLD-RTO: 0 /100 WBCS (ref 0–0)
PLATELET # BLD AUTO: 319 X10*3/UL (ref 150–450)
POTASSIUM SERPL-SCNC: 4.4 MMOL/L (ref 3.5–5.3)
PROT SERPL-MCNC: 6.8 G/DL (ref 6.4–8.2)
RBC # BLD AUTO: 4.09 X10*6/UL (ref 4–5.2)
SODIUM SERPL-SCNC: 138 MMOL/L (ref 136–145)
WBC # BLD AUTO: 5.1 X10*3/UL (ref 4.4–11.3)

## 2024-05-16 PROCEDURE — 1160F RVW MEDS BY RX/DR IN RCRD: CPT | Performed by: INTERNAL MEDICINE

## 2024-05-16 PROCEDURE — 1125F AMNT PAIN NOTED PAIN PRSNT: CPT | Performed by: INTERNAL MEDICINE

## 2024-05-16 PROCEDURE — 85025 COMPLETE CBC W/AUTO DIFF WBC: CPT

## 2024-05-16 PROCEDURE — 86140 C-REACTIVE PROTEIN: CPT

## 2024-05-16 PROCEDURE — 85652 RBC SED RATE AUTOMATED: CPT

## 2024-05-16 PROCEDURE — 1036F TOBACCO NON-USER: CPT | Performed by: INTERNAL MEDICINE

## 2024-05-16 PROCEDURE — 99214 OFFICE O/P EST MOD 30 MIN: CPT | Performed by: INTERNAL MEDICINE

## 2024-05-16 PROCEDURE — 36415 COLL VENOUS BLD VENIPUNCTURE: CPT

## 2024-05-16 PROCEDURE — 80053 COMPREHEN METABOLIC PANEL: CPT

## 2024-05-16 PROCEDURE — 1159F MED LIST DOCD IN RCRD: CPT | Performed by: INTERNAL MEDICINE

## 2024-05-16 RX ORDER — HYDROXYCHLOROQUINE SULFATE 200 MG/1
TABLET, FILM COATED ORAL DAILY
Qty: 60 TABLET | Refills: 1 | Status: SHIPPED | OUTPATIENT
Start: 2024-05-16 | End: 2024-06-21

## 2024-05-16 RX ORDER — CELECOXIB 200 MG/1
200 CAPSULE ORAL DAILY PRN
Qty: 90 CAPSULE | Refills: 0 | Status: SHIPPED | OUTPATIENT
Start: 2024-05-16 | End: 2024-08-14

## 2024-05-16 ASSESSMENT — PAIN SCALES - GENERAL: PAINLEVEL: 4

## 2024-05-16 NOTE — PROGRESS NOTES
Follow-up Rheumatology Patient Visit    Chief Complaint:  Noé Valente is a 68 y.o. female presenting today for Follow-up.    History of Presenting Problem:   68-year-old female with history of DJD of the cervical spine status post fusion presents with various joint complaint. Patient reported she started to have persistent joint complaints done in November 2021 mostly in her bilateral shoulders, it was initially attributed to her degenerative spine disease she underwent spinal surgery but symptoms have persisted and now she is having stiffness in her hands and knees she reports painful range of motion in her bilateral shoulder. And her wrists and hands. She reports occasional stiffness in her knees.  She has tried taking Aleve 2 pills twice a day which offers 50% relief.She is also on gabapentin     Today she reports about good  improvement on current Regimen.  Patient to explain occasional stiffness in her hands worse at night she can try something else other than Enbrel due to cost. She is tolerating MTX 8 pills weekly with folic acid.  She is taking Celebrex daily  Problem List:   Patient Active Problem List   Diagnosis    Age-related osteoporosis without current pathological fracture    Allergic rhinitis    Chronic rhinitis    Hyperlipidemia    Lung nodule    Osteoarthritis of cervical spine    Osteoarthritis of right knee    Rheumatoid arthritis (Multi)    Osteopenia of multiple sites       Past Medical History:   Past Medical History:   Diagnosis Date    Knee pain, bilateral     Personal history of nicotine dependence     History of nicotine dependence    Personal history of other diseases of the respiratory system     History of asthma    Personal history of other diseases of the respiratory system     History of allergic rhinitis    Postmenopausal atrophic vaginitis 09/08/2023    Pulmonary fibrosis, unspecified (Multi)     Postinflammatory pulmonary fibrosis       Surgical History:   Past Surgical History:    Procedure Laterality Date    CERVICAL SPINE SURGERY      C5 fusion    SINUS SURGERY  06/10/2013    Sinus Surgery        Allergies:   Allergies   Allergen Reactions    Erythromycin Unknown    Sulfa (Sulfonamide Antibiotics) Unknown       Medications:   Current Outpatient Medications:     alendronate (Fosamax) 70 mg tablet, Take 1 tablet (70 mg) by mouth every 7 days. Take in the morning with a full glass of water, on an empty stomach, and do not take anything else by mouth or lie down for the next 30 min., Disp: 12 tablet, Rfl: 3    atorvastatin (Lipitor) 20 mg tablet, Take 1 tablet (20 mg) by mouth once daily., Disp: 90 tablet, Rfl: 3    azelastine HCl (AZELASTINE NASL), Administer 2 sprays into affected nostril(s) 2 times a day., Disp: , Rfl:     budesonide-formoteroL (Symbicort) 80-4.5 mcg/actuation inhaler, Inhale 2 puffs 2 times a day., Disp: , Rfl:     calcium carbonate (Oscal) 500 mg calcium (1,250 mg) tablet, Take 1 tablet (1,250 mg) by mouth 2 times a day., Disp: , Rfl:     folic acid (Folvite) 1 mg tablet, Take 1 tablet (1 mg) by mouth once daily., Disp: 90 tablet, Rfl: 0    methotrexate (Trexall) 2.5 mg tablet, TAKE 8 TABLETS ONCE WEEKLY (4 TABLETS WITH BREAKFAST AND 4 TABLETS WITH DINNER), Disp: 96 tablet, Rfl: 0    celecoxib (CeleBREX) 200 mg capsule, Take 1 capsule (200 mg) by mouth once daily as needed for moderate pain (4 - 6)., Disp: 90 capsule, Rfl: 0    cyanocobalamin (Vitamin B-12) 100 mcg tablet, once daily. As directed, Disp: , Rfl:     hydroxychloroquine (Plaquenil) 200 mg tablet, Take 1 tablet (200 mg) by mouth once daily for 7 days, THEN 1 tablet (200 mg) 2 times a day., Disp: 60 tablet, Rfl: 1      Objective   Physical Examination:    Visit Vitals  /82   Pulse 85   Wt 54.6 kg (120 lb 6.4 oz)   BMI 22.75 kg/m²   OB Status Postmenopausal   Smoking Status Former   BSA 1.53 m²      Gen: NAD, A&O x 3  HEENT: clear sclera and conjunctiva,     Musculoskeletal:   Neck; WNL, full  ROM  Shoulder: Pain with shoulder range of motion testing on the left she cannot AB duct past 75 degrees  Elbow:WNL, full ROM, no effusion noted  Wrist and fingers; improved swelling 2nd and 3rd MCP joints bilaterally minimal synovitis persistent , swelling noted in the wrists and PIP is resolved  Knees: No effusions or crepitation, full ROM.  Hips; WNL, full ROM, Negative Telly test  Ankle, Feet; WNL, full ROM    Skin: No rashes or lesions seen, no nail changes  Neuro: A&O x3, Normal Gait    Procedures :None    Orders:  Orders Placed This Encounter   Procedures    CBC and Auto Differential    Comprehensive Metabolic Panel    C-Reactive Protein    Sedimentation Rate        Provider Impression:   Assessment/Plan   Encounter Diagnoses   Name Primary?    Rheumatoid arthritis with positive rheumatoid factor, involving unspecified site (Multi) Yes    Age-related osteoporosis without current pathological fracture           68-year-old female with history of DJD of  cervical spine status post fusion presents with various joint complaint.On exam she has some inflammatory findings in her hands concerning for rheumatoid arthritis. Blood work shows positive rheumatoid factor and CCP  -Start  mg, wean up to 1 pill twice a day  -Continue with methotrexate, continue with 8 pills weekly with folic acid daily . Holding  off Enbrel for now, patient concern about cost..  -Continue with Celebrex 200 mg daily as needed, recommend Pepcid for GI prophylaxis  -Check routine labs   -Hx of osteoporosis on Fosamax per gynecology  -Follow-up in 2 months

## 2024-07-10 DIAGNOSIS — M05.9 RHEUMATOID ARTHRITIS WITH POSITIVE RHEUMATOID FACTOR, INVOLVING UNSPECIFIED SITE (MULTI): ICD-10-CM

## 2024-07-10 RX ORDER — CELECOXIB 200 MG/1
CAPSULE ORAL
Qty: 90 CAPSULE | Refills: 0 | Status: SHIPPED | OUTPATIENT
Start: 2024-07-10

## 2024-07-14 DIAGNOSIS — M05.9 SEROPOSITIVE RHEUMATOID ARTHRITIS (MULTI): ICD-10-CM

## 2024-07-14 DIAGNOSIS — M05.9 RHEUMATOID ARTHRITIS WITH POSITIVE RHEUMATOID FACTOR, INVOLVING UNSPECIFIED SITE (MULTI): ICD-10-CM

## 2024-07-15 RX ORDER — METHOTREXATE 2.5 MG/1
TABLET ORAL
Qty: 32 TABLET | Refills: 0 | Status: SHIPPED | OUTPATIENT
Start: 2024-07-15

## 2024-07-15 RX ORDER — HYDROXYCHLOROQUINE SULFATE 200 MG/1
200 TABLET, FILM COATED ORAL 2 TIMES DAILY
Qty: 60 TABLET | Refills: 0 | Status: SHIPPED | OUTPATIENT
Start: 2024-07-15 | End: 2024-08-14

## 2024-07-31 ASSESSMENT — ENCOUNTER SYMPTOMS
SORE THROAT: 1
TROUBLE SWALLOWING: 1

## 2024-08-01 ENCOUNTER — OFFICE VISIT (OUTPATIENT)
Dept: PRIMARY CARE | Facility: CLINIC | Age: 69
End: 2024-08-01
Payer: MEDICARE

## 2024-08-01 ENCOUNTER — LAB (OUTPATIENT)
Dept: LAB | Facility: LAB | Age: 69
End: 2024-08-01
Payer: MEDICARE

## 2024-08-01 ENCOUNTER — APPOINTMENT (OUTPATIENT)
Dept: RHEUMATOLOGY | Facility: CLINIC | Age: 69
End: 2024-08-01
Payer: MEDICARE

## 2024-08-01 VITALS
HEART RATE: 80 BPM | TEMPERATURE: 97.9 F | WEIGHT: 120.4 LBS | BODY MASS INDEX: 22.75 KG/M2 | DIASTOLIC BLOOD PRESSURE: 77 MMHG | SYSTOLIC BLOOD PRESSURE: 118 MMHG

## 2024-08-01 VITALS
HEART RATE: 80 BPM | BODY MASS INDEX: 22.79 KG/M2 | DIASTOLIC BLOOD PRESSURE: 77 MMHG | OXYGEN SATURATION: 97 % | WEIGHT: 120.6 LBS | SYSTOLIC BLOOD PRESSURE: 117 MMHG

## 2024-08-01 DIAGNOSIS — R73.02 IGT (IMPAIRED GLUCOSE TOLERANCE): ICD-10-CM

## 2024-08-01 DIAGNOSIS — K21.9 GASTROESOPHAGEAL REFLUX DISEASE WITHOUT ESOPHAGITIS: ICD-10-CM

## 2024-08-01 DIAGNOSIS — R13.19 ESOPHAGEAL DYSPHAGIA: Primary | ICD-10-CM

## 2024-08-01 DIAGNOSIS — M05.9 RHEUMATOID ARTHRITIS WITH POSITIVE RHEUMATOID FACTOR, INVOLVING UNSPECIFIED SITE (MULTI): ICD-10-CM

## 2024-08-01 DIAGNOSIS — L30.9 DERMATITIS: ICD-10-CM

## 2024-08-01 DIAGNOSIS — E78.2 MIXED HYPERLIPIDEMIA: ICD-10-CM

## 2024-08-01 DIAGNOSIS — M05.9 SEROPOSITIVE RHEUMATOID ARTHRITIS (MULTI): ICD-10-CM

## 2024-08-01 LAB
ALBUMIN SERPL BCP-MCNC: 3.6 G/DL (ref 3.4–5)
ALP SERPL-CCNC: 92 U/L (ref 33–136)
ALT SERPL W P-5'-P-CCNC: 56 U/L (ref 7–45)
ANION GAP SERPL CALC-SCNC: 12 MMOL/L (ref 10–20)
AST SERPL W P-5'-P-CCNC: 56 U/L (ref 9–39)
BASOPHILS # BLD AUTO: 0 X10*3/UL (ref 0–0.1)
BASOPHILS NFR BLD AUTO: 0 %
BILIRUB SERPL-MCNC: 0.6 MG/DL (ref 0–1.2)
BUN SERPL-MCNC: 13 MG/DL (ref 6–23)
CALCIUM SERPL-MCNC: 9 MG/DL (ref 8.6–10.6)
CHLORIDE SERPL-SCNC: 99 MMOL/L (ref 98–107)
CO2 SERPL-SCNC: 29 MMOL/L (ref 21–32)
CREAT SERPL-MCNC: 0.49 MG/DL (ref 0.5–1.05)
CRP SERPL-MCNC: 0.63 MG/DL
EGFRCR SERPLBLD CKD-EPI 2021: >90 ML/MIN/1.73M*2
EOSINOPHIL # BLD AUTO: 0.13 X10*3/UL (ref 0–0.7)
EOSINOPHIL NFR BLD AUTO: 3.4 %
ERYTHROCYTE [DISTWIDTH] IN BLOOD BY AUTOMATED COUNT: 12.8 % (ref 11.5–14.5)
ERYTHROCYTE [SEDIMENTATION RATE] IN BLOOD BY WESTERGREN METHOD: 26 MM/H (ref 0–30)
EST. AVERAGE GLUCOSE BLD GHB EST-MCNC: 105 MG/DL
GLUCOSE SERPL-MCNC: 89 MG/DL (ref 74–99)
HBA1C MFR BLD: 5.3 %
HCT VFR BLD AUTO: 39.3 % (ref 36–46)
HGB BLD-MCNC: 13 G/DL (ref 12–16)
IMM GRANULOCYTES # BLD AUTO: 0.01 X10*3/UL (ref 0–0.7)
IMM GRANULOCYTES NFR BLD AUTO: 0.3 % (ref 0–0.9)
LYMPHOCYTES # BLD AUTO: 0.71 X10*3/UL (ref 1.2–4.8)
LYMPHOCYTES NFR BLD AUTO: 18.5 %
MCH RBC QN AUTO: 32.4 PG (ref 26–34)
MCHC RBC AUTO-ENTMCNC: 33.1 G/DL (ref 32–36)
MCV RBC AUTO: 98 FL (ref 80–100)
MONOCYTES # BLD AUTO: 0.14 X10*3/UL (ref 0.1–1)
MONOCYTES NFR BLD AUTO: 3.7 %
NEUTROPHILS # BLD AUTO: 2.84 X10*3/UL (ref 1.2–7.7)
NEUTROPHILS NFR BLD AUTO: 74.1 %
NRBC BLD-RTO: 0 /100 WBCS (ref 0–0)
PLATELET # BLD AUTO: 272 X10*3/UL (ref 150–450)
POTASSIUM SERPL-SCNC: 4.5 MMOL/L (ref 3.5–5.3)
PROT SERPL-MCNC: 6.8 G/DL (ref 6.4–8.2)
RBC # BLD AUTO: 4.01 X10*6/UL (ref 4–5.2)
SODIUM SERPL-SCNC: 135 MMOL/L (ref 136–145)
WBC # BLD AUTO: 3.8 X10*3/UL (ref 4.4–11.3)

## 2024-08-01 PROCEDURE — 1160F RVW MEDS BY RX/DR IN RCRD: CPT | Performed by: INTERNAL MEDICINE

## 2024-08-01 PROCEDURE — 36415 COLL VENOUS BLD VENIPUNCTURE: CPT

## 2024-08-01 PROCEDURE — 83036 HEMOGLOBIN GLYCOSYLATED A1C: CPT

## 2024-08-01 PROCEDURE — 1159F MED LIST DOCD IN RCRD: CPT | Performed by: INTERNAL MEDICINE

## 2024-08-01 PROCEDURE — 85025 COMPLETE CBC W/AUTO DIFF WBC: CPT

## 2024-08-01 PROCEDURE — 1126F AMNT PAIN NOTED NONE PRSNT: CPT | Performed by: INTERNAL MEDICINE

## 2024-08-01 PROCEDURE — 86140 C-REACTIVE PROTEIN: CPT

## 2024-08-01 PROCEDURE — 1036F TOBACCO NON-USER: CPT | Performed by: INTERNAL MEDICINE

## 2024-08-01 PROCEDURE — 85652 RBC SED RATE AUTOMATED: CPT

## 2024-08-01 PROCEDURE — 80053 COMPREHEN METABOLIC PANEL: CPT

## 2024-08-01 PROCEDURE — 99214 OFFICE O/P EST MOD 30 MIN: CPT | Performed by: INTERNAL MEDICINE

## 2024-08-01 RX ORDER — TRIAMCINOLONE ACETONIDE 1 MG/ML
LOTION TOPICAL 3 TIMES DAILY
Qty: 60 ML | Refills: 3 | Status: SHIPPED | OUTPATIENT
Start: 2024-08-01

## 2024-08-01 RX ORDER — KETOCONAZOLE 20 MG/ML
SHAMPOO, SUSPENSION TOPICAL
COMMUNITY
Start: 2024-04-15

## 2024-08-01 RX ORDER — TRIAMCINOLONE ACETONIDE 1 MG/G
OINTMENT TOPICAL 2 TIMES DAILY
Qty: 30 G | Refills: 3 | Status: SHIPPED | OUTPATIENT
Start: 2024-08-01

## 2024-08-01 RX ORDER — PANTOPRAZOLE SODIUM 40 MG/1
40 TABLET, DELAYED RELEASE ORAL
Qty: 45 TABLET | Refills: 0 | Status: SHIPPED | OUTPATIENT
Start: 2024-08-01 | End: 2024-09-15

## 2024-08-01 RX ORDER — ATORVASTATIN CALCIUM 20 MG/1
20 TABLET, FILM COATED ORAL DAILY
Qty: 90 TABLET | Refills: 3 | Status: SHIPPED | OUTPATIENT
Start: 2024-08-01 | End: 2025-08-01

## 2024-08-01 RX ORDER — METHOTREXATE 2.5 MG/1
20 TABLET ORAL
Qty: 96 TABLET | Refills: 0 | Status: SHIPPED | OUTPATIENT
Start: 2024-08-04 | End: 2024-11-02

## 2024-08-01 RX ORDER — MOMETASONE FUROATE AND FORMOTEROL FUMARATE DIHYDRATE 200; 5 UG/1; UG/1
AEROSOL RESPIRATORY (INHALATION)
COMMUNITY
Start: 2024-07-16 | End: 2024-08-01 | Stop reason: ALTCHOICE

## 2024-08-01 RX ORDER — HYDROXYCHLOROQUINE SULFATE 200 MG/1
200 TABLET, FILM COATED ORAL 2 TIMES DAILY
Qty: 180 TABLET | Refills: 1 | Status: SHIPPED | OUTPATIENT
Start: 2024-08-01 | End: 2024-10-30

## 2024-08-01 ASSESSMENT — ENCOUNTER SYMPTOMS
SORE THROAT: 1
SHORTNESS OF BREATH: 0
COUGH: 0
TROUBLE SWALLOWING: 1
PALPITATIONS: 0
POLYDIPSIA: 0
FEVER: 0
CHILLS: 0

## 2024-08-01 ASSESSMENT — PAIN SCALES - GENERAL: PAINLEVEL: 0-NO PAIN

## 2024-08-01 NOTE — PROGRESS NOTES
Follow-up Rheumatology Patient Visit    Chief Complaint:  Noé Valente is a 68 y.o. female presenting today for 2 month FUV.    History of Presenting Problem:   68-year-old female with history of DJD of the cervical spine status post fusion presents with various joint complaint. Patient reported she started to have persistent joint complaints done in November 2021 mostly in her bilateral shoulders, it was initially attributed to her degenerative spine disease she underwent spinal surgery but symptoms have persisted and now she is having stiffness in her hands and knees she reports painful range of motion in her bilateral shoulder. And her wrists and hands. She reports occasional stiffness in her knees.  She has tried taking Aleve 2 pills twice a day which offers 50% relief.She is also on gabapentin     Today she reports about good  improvement on current Regimen of methotrexate and hydroxychloroquine.  She is tolerating hydroxychloroquine 200 mg twice a day she is taking methotrexate 8 pills weekly with daily folic acid.  She is taking Celebrex daily  She denies significant morning stiffness hide hand range of motion is much improved.  She is looking forward to enjoying recent alf and traveling  Problem List:   Patient Active Problem List   Diagnosis    Age-related osteoporosis without current pathological fracture    Allergic rhinitis    Chronic rhinitis    Hyperlipidemia    Lung nodule    Osteoarthritis of cervical spine    Osteoarthritis of right knee    Rheumatoid arthritis (Multi)    Osteopenia of multiple sites       Past Medical History:   Past Medical History:   Diagnosis Date    Allergic     Anemia     Frozen shoulder     Knee pain, bilateral     Personal history of nicotine dependence     History of nicotine dependence    Personal history of other diseases of the respiratory system     History of asthma    Personal history of other diseases of the respiratory system     History of allergic rhinitis     Postmenopausal atrophic vaginitis 09/08/2023    Pulmonary fibrosis, unspecified (Multi)     Postinflammatory pulmonary fibrosis    Rheumatoid arthritis (Multi) 2023       Surgical History:   Past Surgical History:   Procedure Laterality Date    CERVICAL SPINE SURGERY      C5 fusion    SINUS SURGERY  06/10/2013    Sinus Surgery        Allergies:   Allergies   Allergen Reactions    Erythromycin Unknown    Sulfa (Sulfonamide Antibiotics) Unknown       Medications:   Current Outpatient Medications:     alendronate (Fosamax) 70 mg tablet, Take 1 tablet (70 mg) by mouth every 7 days. Take in the morning with a full glass of water, on an empty stomach, and do not take anything else by mouth or lie down for the next 30 min., Disp: 12 tablet, Rfl: 3    atorvastatin (Lipitor) 20 mg tablet, Take 1 tablet (20 mg) by mouth once daily., Disp: 90 tablet, Rfl: 3    azelastine HCl (AZELASTINE NASL), Administer 2 sprays into affected nostril(s) 2 times a day., Disp: , Rfl:     calcium carbonate (Oscal) 500 mg calcium (1,250 mg) tablet, Take 1 tablet (1,250 mg) by mouth 2 times a day., Disp: , Rfl:     celecoxib (CeleBREX) 200 mg capsule, TAKE 1 CAPSULE BY MOUTH ONCE DAILY AS NEEDED FOR MODERATE PAIN, Disp: 90 capsule, Rfl: 0    folic acid (Folvite) 1 mg tablet, Take 1 tablet (1 mg) by mouth once daily., Disp: 90 tablet, Rfl: 0    ketoconazole (NIZOral) 2 % shampoo, use as directed, Disp: , Rfl:     pantoprazole (ProtoNix) 40 mg EC tablet, Take 1 tablet (40 mg) by mouth once daily in the morning. Take before meals. Do not crush, chew, or split., Disp: 45 tablet, Rfl: 0    triamcinolone (Kenalog) 0.1 % lotion, Apply topically 3 times a day., Disp: 60 mL, Rfl: 3    triamcinolone (Kenalog) 0.1 % ointment, Apply topically 2 times a day., Disp: 30 g, Rfl: 3    budesonide-formoteroL (Symbicort) 80-4.5 mcg/actuation inhaler, Inhale 2 puffs 2 times a day., Disp: , Rfl:     hydroxychloroquine (Plaquenil) 200 mg tablet, Take 1 tablet (200  mg) by mouth 2 times a day., Disp: 180 tablet, Rfl: 1    [START ON 8/4/2024] methotrexate (Trexall) 2.5 mg tablet, Take 8 tablets (20 mg total) by mouth 1 (one) time per week.  Take 8 tablets once weekly (4 tablets with breakfast and 4 tablets with dinner), Disp: 96 tablet, Rfl: 0      Objective   Physical Examination:    Visit Vitals  /77 (BP Location: Left arm, Patient Position: Sitting)   Pulse 80   Wt 54.7 kg (120 lb 9.6 oz)   SpO2 97%   BMI 22.79 kg/m²   OB Status Postmenopausal   Smoking Status Former   BSA 1.53 m²      Gen: NAD, A&O x 3  HEENT: clear sclera and conjunctiva,     Musculoskeletal:   Neck; WNL, full ROM  Shoulder: Pain with shoulder range of motion testing on the left she cannot AB duct past 75 degrees  Elbow:WNL, full ROM, no effusion noted  Wrist and fingers; improved swelling 2nd and 3rd MCP joints bilaterally minimal synovitis persistent , swelling noted in the wrists and PIP is resolved  Knees: No effusions or crepitation, full ROM.  Hips; WNL, full ROM, Negative Telly test  Ankle, Feet; WNL, full ROM    Skin: No rashes or lesions seen, no nail changes  Neuro: A&O x3, Normal Gait    Procedures :None    Orders:  Orders Placed This Encounter   Procedures    CBC and Auto Differential    Comprehensive Metabolic Panel    C-Reactive Protein    Sedimentation Rate        Provider Impression:   Assessment/Plan   Encounter Diagnoses   Name Primary?    Rheumatoid arthritis with positive rheumatoid factor, involving unspecified site (Multi)     Seropositive rheumatoid arthritis (Multi)      68-year-old female with history of DJD of  cervical spine status post fusion presents with various joint complaint.On exam she has some inflammatory findings in her hands concerning for rheumatoid arthritis. Blood work shows positive rheumatoid factor and CCP  -Continue with   mg  twice a day  -Continue with methotrexate, continue with 8 pills weekly with folic acid daily . Holding  off Enbrel for now  given improvement with hydroxychloroquine and also cost patient  -Continue with Celebrex 200 mg daily as needed, recommend Pepcid for GI prophylaxis  -Check routine labs   -Hx of osteoporosis on Fosamax per gynecology  -Follow-up in 4 months

## 2024-08-01 NOTE — PROGRESS NOTES
Subjective   Patient ID: Noé Valente is a 68 y.o. female who presents for Sore Throat.    58-year-old female presents today to discuss an acute issue of Mid to lower esophageal pill dysphagia with slow movementof bolus associated with intermittent episodes of esophageal burning throat discomfort after recent origin.  She was advised to start a low-dose OTC acid medication by her rheumatologist and she schedule follow-up with me.  No prior similar symptoms again.  Advised about foods that provoke acid reflux and advised to evaluate for reduction or adjustments.  Started pantoprazole 40 mg for 6 weeks.  Advised esophageal x-ray for the purposes of evaluating for Schatzki ring or other abnormalities or pill dysphagia in the mid to lower esophagus.  Suspect acid reflux with esophagitis hopefully with resolution completely with patient treatment.  If necessary refills can be provided if symptoms fail to improve reevaluation or additional testing such as EGD may be necessary.    Patient also requesting refills of medications some of which were provided by her dermatologist that she has not seen in some time but would like to continue using them.  Patient has available prescriptions on hand for referencing.    Sore Throat   This is a recurrent problem. The current episode started more than 1 month ago. The problem has been waxing and waning. The pain is worse on the right side. There has been no fever. The pain is at a severity of 1/10. The patient is experiencing no pain. Associated symptoms include congestion and trouble swallowing. Pertinent negatives include no coughing or shortness of breath.        Review of Systems   Constitutional:  Negative for chills and fever.   HENT:  Positive for congestion, sore throat and trouble swallowing.    Respiratory:  Negative for cough and shortness of breath.    Cardiovascular:  Negative for chest pain and palpitations.   Endocrine: Negative for polydipsia and polyuria.        Objective   /77 (BP Location: Right arm, Patient Position: Sitting, BP Cuff Size: Adult)   Pulse 80   Temp 36.6 °C (97.9 °F) (Temporal)   Wt 54.6 kg (120 lb 6.4 oz)   BMI 22.75 kg/m²     Physical Exam  Constitutional:       Appearance: Normal appearance.   HENT:      Head: Normocephalic and atraumatic.   Eyes:      Extraocular Movements: Extraocular movements intact.      Pupils: Pupils are equal, round, and reactive to light.   Neck:      Thyroid: No thyroid mass or thyromegaly.      Vascular: No carotid bruit.   Cardiovascular:      Rate and Rhythm: Normal rate and regular rhythm.      Heart sounds: No murmur heard.     No friction rub. No gallop.   Pulmonary:      Effort: No respiratory distress.      Breath sounds: No wheezing, rhonchi or rales.   Musculoskeletal:      Cervical back: Neck supple.      Right lower leg: No edema.      Left lower leg: No edema.   Lymphadenopathy:      Cervical: No cervical adenopathy.   Neurological:      Mental Status: She is alert.         Assessment/Plan

## 2024-08-28 ENCOUNTER — HOSPITAL ENCOUNTER (OUTPATIENT)
Dept: RADIOLOGY | Facility: HOSPITAL | Age: 69
Discharge: HOME | End: 2024-08-28
Payer: MEDICARE

## 2024-08-28 DIAGNOSIS — K21.9 GASTROESOPHAGEAL REFLUX DISEASE WITHOUT ESOPHAGITIS: ICD-10-CM

## 2024-08-28 DIAGNOSIS — R13.19 ESOPHAGEAL DYSPHAGIA: ICD-10-CM

## 2024-08-28 PROCEDURE — 2500000005 HC RX 250 GENERAL PHARMACY W/O HCPCS: Performed by: INTERNAL MEDICINE

## 2024-08-28 PROCEDURE — 2500000001 HC RX 250 WO HCPCS SELF ADMINISTERED DRUGS (ALT 637 FOR MEDICARE OP): Performed by: INTERNAL MEDICINE

## 2024-08-28 PROCEDURE — 74221 X-RAY XM ESOPHAGUS 2CNTRST: CPT | Performed by: RADIOLOGY

## 2024-08-28 PROCEDURE — 74220 X-RAY XM ESOPHAGUS 1CNTRST: CPT

## 2024-08-28 PROCEDURE — A9698 NON-RAD CONTRAST MATERIALNOC: HCPCS | Performed by: INTERNAL MEDICINE

## 2024-09-03 ENCOUNTER — APPOINTMENT (OUTPATIENT)
Dept: PRIMARY CARE | Facility: CLINIC | Age: 69
End: 2024-09-03
Payer: MEDICARE

## 2024-09-03 VITALS
DIASTOLIC BLOOD PRESSURE: 82 MMHG | SYSTOLIC BLOOD PRESSURE: 113 MMHG | BODY MASS INDEX: 22.86 KG/M2 | HEART RATE: 87 BPM | TEMPERATURE: 98 F | WEIGHT: 121 LBS

## 2024-09-03 DIAGNOSIS — M81.0 AGE-RELATED OSTEOPOROSIS WITHOUT CURRENT PATHOLOGICAL FRACTURE: ICD-10-CM

## 2024-09-03 DIAGNOSIS — K21.9 GASTROESOPHAGEAL REFLUX DISEASE WITHOUT ESOPHAGITIS: Primary | ICD-10-CM

## 2024-09-03 DIAGNOSIS — T75.3XXD MOTION SICKNESS, SUBSEQUENT ENCOUNTER: ICD-10-CM

## 2024-09-03 DIAGNOSIS — R13.19 ESOPHAGEAL DYSPHAGIA: ICD-10-CM

## 2024-09-03 PROBLEM — R91.1 LUNG NODULE: Status: RESOLVED | Noted: 2023-09-08 | Resolved: 2024-09-03

## 2024-09-03 PROBLEM — J31.0 CHRONIC RHINITIS: Status: RESOLVED | Noted: 2023-09-08 | Resolved: 2024-09-03

## 2024-09-03 PROCEDURE — 99214 OFFICE O/P EST MOD 30 MIN: CPT | Performed by: INTERNAL MEDICINE

## 2024-09-03 PROCEDURE — 1159F MED LIST DOCD IN RCRD: CPT | Performed by: INTERNAL MEDICINE

## 2024-09-03 PROCEDURE — G2211 COMPLEX E/M VISIT ADD ON: HCPCS | Performed by: INTERNAL MEDICINE

## 2024-09-03 PROCEDURE — 1036F TOBACCO NON-USER: CPT | Performed by: INTERNAL MEDICINE

## 2024-09-03 PROCEDURE — 1160F RVW MEDS BY RX/DR IN RCRD: CPT | Performed by: INTERNAL MEDICINE

## 2024-09-03 RX ORDER — ACETAMINOPHEN 325 MG/1
650 TABLET ORAL ONCE
OUTPATIENT
Start: 2024-09-03 | End: 2024-09-03

## 2024-09-03 RX ORDER — SCOLOPAMINE TRANSDERMAL SYSTEM 1 MG/1
1 PATCH, EXTENDED RELEASE TRANSDERMAL
Qty: 25 PATCH | Refills: 0 | Status: SHIPPED | OUTPATIENT
Start: 2024-09-03 | End: 2024-11-17

## 2024-09-03 RX ORDER — FAMOTIDINE 10 MG/ML
20 INJECTION INTRAVENOUS ONCE AS NEEDED
OUTPATIENT
Start: 2024-09-03

## 2024-09-03 RX ORDER — ZOLEDRONIC ACID 5 MG/100ML
5 INJECTION, SOLUTION INTRAVENOUS ONCE
OUTPATIENT
Start: 2024-09-03

## 2024-09-03 RX ORDER — PANTOPRAZOLE SODIUM 40 MG/1
40 TABLET, DELAYED RELEASE ORAL
Qty: 90 TABLET | Refills: 1 | Status: SHIPPED | OUTPATIENT
Start: 2024-09-03 | End: 2025-03-02

## 2024-09-03 RX ORDER — EPINEPHRINE 0.3 MG/.3ML
0.3 INJECTION SUBCUTANEOUS EVERY 5 MIN PRN
OUTPATIENT
Start: 2024-09-03

## 2024-09-03 RX ORDER — ALBUTEROL SULFATE 0.83 MG/ML
3 SOLUTION RESPIRATORY (INHALATION) AS NEEDED
OUTPATIENT
Start: 2024-09-03

## 2024-09-03 RX ORDER — DIPHENHYDRAMINE HYDROCHLORIDE 50 MG/ML
50 INJECTION INTRAMUSCULAR; INTRAVENOUS AS NEEDED
OUTPATIENT
Start: 2024-09-03

## 2024-09-03 ASSESSMENT — ENCOUNTER SYMPTOMS
FEVER: 0
COUGH: 0
CHILLS: 0
PALPITATIONS: 0
SHORTNESS OF BREATH: 0
POLYDIPSIA: 0

## 2024-09-03 NOTE — ASSESSMENT & PLAN NOTE
Continue bisphosphonate therapy, but change to yearly infusion due to chronic GERD, and association of GI symptoms with weekly pill use. Zoledronic Acid Therapy plan ordered

## 2024-09-03 NOTE — PROGRESS NOTES
Subjective   Patient ID: Noé Valente is a 68 y.o. female who presents for Follow-up.    68-year-old female with a history of chronic GERD presents for follow-up after evaluation by esophagram for esophageal dysphagia.  Negative exam no ring or narrowing or strictures noted.  Patient has improved noticeably with her symptoms with the following changes, addition of pantoprazole 4 weeks ago at our evaluation and also discontinuation of hydroxychloroquine.  She also takes a bisphosphonate weekly and notices that the day she takes that her symptoms are more active as well.  We discussed transition off bisphosphonate therapy orally and onto infusion based bisphosphonate therapy to continue the excellent benefits of treating her osteoporosis but avoiding the unwelcome and undesirable side effects for her GI system which she is experiencing both chronically and to worsen degree on the day she takes that medication despite infrequent use.  Patient agreeable to this transition in care, the process of making that transition was started today with an order for a therapy plan to be activated.  Patient will be traveling for the majority of the end of the year both to [place] and then for a prolonged cruise, for her cruise she is requesting scopolamine Patches that we will cover her for 70 days of her cruise.         Review of Systems   Constitutional:  Negative for chills and fever.   Respiratory:  Negative for cough and shortness of breath.    Cardiovascular:  Negative for chest pain and palpitations.   Endocrine: Negative for polydipsia and polyuria.       Objective   /82   Pulse 87   Temp 36.7 °C (98 °F) (Temporal)   Wt 54.9 kg (121 lb)   BMI 22.86 kg/m²     Physical Exam  Constitutional:       Appearance: Normal appearance.   HENT:      Head: Normocephalic and atraumatic.   Eyes:      Extraocular Movements: Extraocular movements intact.      Pupils: Pupils are equal, round, and reactive to light.   Neck:      Thyroid:  No thyroid mass or thyromegaly.      Vascular: No carotid bruit.   Cardiovascular:      Rate and Rhythm: Normal rate and regular rhythm.      Heart sounds: No murmur heard.     No friction rub. No gallop.   Pulmonary:      Effort: No respiratory distress.      Breath sounds: No wheezing, rhonchi or rales.   Musculoskeletal:      Cervical back: Neck supple.      Right lower leg: No edema.      Left lower leg: No edema.   Lymphadenopathy:      Cervical: No cervical adenopathy.   Neurological:      Mental Status: She is alert.         Assessment/Plan   Assessment & Plan  Gastroesophageal reflux disease without esophagitis    Orders:    pantoprazole (ProtoNix) 40 mg EC tablet; Take 1 tablet (40 mg) by mouth once daily in the morning. Take before meals. Do not crush, chew, or split.    Esophageal dysphagia    Orders:    pantoprazole (ProtoNix) 40 mg EC tablet; Take 1 tablet (40 mg) by mouth once daily in the morning. Take before meals. Do not crush, chew, or split.    Age-related osteoporosis without current pathological fracture  Continue bisphosphonate therapy, but change to yearly infusion due to chronic GERD, and association of GI symptoms with weekly pill use. Zoledronic Acid Therapy plan ordered       Motion sickness, subsequent encounter    Orders:    scopolamine (Transderm-Scop) 1 mg over 3 days patch 3 day; Place 1 patch over 72 hours on the skin every 3rd day if needed (nausea).

## 2024-09-04 NOTE — PROGRESS NOTES
Subjective      Chief Complaint   Patient presents with    Left Knee - Pain    Right Knee - Pain        No surgery found     HPI  This 68 year young woman presents today with right and left knee pain (8/10). She states that his right and left knee pain has been worsening and persistent for the past for months. She states that she had good relief of the right knee pain from previous cortisone injections last in December of 2023. She denies trauma or injury to the right or left knee. She states that the right and left knee pain is worse with and aggravated by prolonged walking, bending and standing. She states that this right and left knee pain impairs her ability to complete her normal activities of daily living. She states that she was diagnosed with RA and is following with Rheumatology. She states that she has multiple body aches, including shoulders, hips and knees. She presents today for a discussion of further options.    CARDIOLOGY:   Negative for chest pain, shortness of breath.   RESPIRATORY:   Negative for chest pain, shortness of breath.   MUSCULOSKELETAL:   See HPI for details.   NEUROLOGY:   Negative for tingling, numbness, weakness.    Objective    There were no vitals filed for this visit.    Physical Exam  GENERAL:          General Appearance:  This is a pleasant patient with appropriate affect, in no acute distress.   DERMATOLOGY:          Skin: skin at the neck, upper and lower back, and trunk is intact. There is no evidence of skin rash, skin breakdown or ulceration, or atrophic skin change.   EXTREMITIES:          Vascular:  Right, left hands and feet are warm with good color and pulses. Right and left calf and thigh are nontender and nonswollen.   NEUROLOGICAL:          Orientation:  Patient is alert and oriented to person, place, time and situation. Right and left upper and lower extremity motor and sensory examinations are intact.      MUSCULOSKELETAL: Neck: Nontender. No pain with range of  motion. Back: No tenderness. Straight leg test negative bilaterally. Right and left hips: Nontender. No pain or limitation with ROM. Left and Right knee: There is tenderness over the medial compartment of the right and left knee. There is pain with but no limitation of ROM of the right or left knee. McMurrays is negative. Anterior drawer and lachmans are negative. No effusion present. Compartments are soft. No tenderness in the right or left lower extrremity. Distal pulses are palpable. Neruovascular is intact. Standing AP x-rays of the left and right knee and lateral of the right done and read in the office today show that there is mild osteoarthritis of the right and left knee with loss of joint surface cartilage and sclerosis and are reviewed with the patient in the office today.    MR shoulder right wo IV contrast    Result Date: 12/18/2023  Interpreted By:  Talib Davis and Marshall Colin STUDY: MRI of the  right shoulder without IV contrast;  12/18/2023 1:36 pm   INDICATION: Signs/Symptoms:EVAL CUFF TEAR.   COMPARISON: Right shoulder radiographs dated 12/15/2023.   ACCESSION NUMBER(S): [id number]   ORDERING CLINICIAN: JUAN LIM   TECHNIQUE: MR imaging of the  right shoulder was obtained  without IV contrast.   FINDINGS: ROTATOR CUFF TENDONS: Mild supraspinatus and infraspinatus tendinosis with hyperintense intrasubstance signal and thickening. There is minimal low-grade bursal sided fraying of the insertional supraspinatus tendon without high-grade or full-thickness tear. Otherwise, the supraspinatus, infraspinatus, subscapularis, and teres minor tendons are intact. Mild diffuse rotator cuff muscle atrophy.   BICEPS TENDON AND ROTATOR INTERVAL: The intra-articular long head biceps tendon is intact and has a normal course. The rotator interval is unremarkable.   JOINTS: Mild acromioclavicular joint osteoarthrosis with cartilage loss and osteophytosis.   Mild glenohumeral joint osteoarthrosis with  mild diffuse cartilage thinning and osteophytosis.   Moderate volume subacromial subdeltoid bursal fluid. No joint effusion.   LABRUM: Circumferential degenerative truncation of the glenoid labrum.   OSSEOUS STRUCTURES: No focal marrow replacing lesions are identified. There is no fracture.   SOFT TISSUES: The suprascapular nerve is intact at the suprascapular and spinoglenoid notches.       1. Mild supraspinatus and infraspinatus tendinosis with minimal bursal sided fraying of the insertional supraspinatus tendon. 2. Moderate volume subacromial subdeltoid bursal fluid may reflect bursitis. 3. Mild acromioclavicular and glenohumeral joint osteoarthrosis.     I personally reviewed the images/study and I agree with the findings as stated. This study was interpreted at [place], [place].   Signed by: Talib Davis 12/18/2023 3:59 PM Dictation workstation:   [id number]    XR shoulder 2+ views bilateral    Result Date: 12/16/2023  Interpreted By:  Godfrey Mcnamara, STUDY: XR SHOULDER 2+ VIEWS BILATERAL;  12/15/2023 11:03 am   INDICATION: Signs/Symptoms:PAIN.   COMPARISON: none   ACCESSION NUMBER(S): [id number]   ORDERING CLINICIAN: JUAN LIM   FINDINGS: Three views bilateral shoulders.   Bilateral Mild acromioclavicular osteoarthrosis. Bilateral glenohumeral joint are intact. No fracture or dislocation. No osseous lesion. Soft tissues intact   IMPRESSION Mild bilateral acromioclavicular osteoarthrosis. The glenohumeral joints appear intact.     MACRO none   Signed by: Godfrey Mcnamara 12/16/2023 10:07 AM Dictation workstation:   [id number]     Patient ID: Noé Valente is a 68 y.o. female.    Patient ID: Noé Valente is a 68 y.o. female.    L Inj/Asp: bilateral knee on 9/5/2024 9:36 AM  Indications: pain  Details: 22 G needle, medial approach  Medications (Right): 40 mg triamcinolone acetonide 40 mg/mL; 1 mL lidocaine 10 mg/mL (1 %)  Medications (Left): 40 mg triamcinolone  acetonide 40 mg/mL; 1 mL lidocaine 10 mg/mL (1 %)  Outcome: tolerated well, no immediate complications  Procedure, treatment alternatives, risks and benefits explained, specific risks discussed. Immediately prior to procedure a time out was called to verify the correct patient, procedure, equipment, support staff and site/side marked as required. Patient was prepped and draped in the usual sterile fashion.           Noé was seen today for pain and pain.  Diagnoses and all orders for this visit:  Bilateral chronic knee pain (Primary)  Primary osteoarthritis of right knee  Primary osteoarthritis of left knee   Options are discussed with the patient in detail.  The patient is instructed regarding activity modification, ice, physician assistant directed at home gentle strengthening and ROM exercises, and the appropriate use of Tylenol as needed for pain with its potential adverse reactions and side effects. The patient understands. The patient states that despite all the treatment listed above that this left and right knee pain is debilitating and requests a discussion of further options. Cortisone injection to the left and right knee is discussed in the office today. This is done in the office today. See procedures below. Return in 6 weeks for reevaluation and consider viscosupplementation injections or sooner as needed.    Anupama Spence PA-C

## 2024-09-05 ENCOUNTER — OFFICE VISIT (OUTPATIENT)
Dept: ORTHOPEDIC SURGERY | Facility: CLINIC | Age: 69
End: 2024-09-05
Payer: MEDICARE

## 2024-09-05 ENCOUNTER — HOSPITAL ENCOUNTER (OUTPATIENT)
Dept: RADIOLOGY | Facility: CLINIC | Age: 69
Discharge: HOME | End: 2024-09-05
Payer: MEDICARE

## 2024-09-05 VITALS — BODY MASS INDEX: 22.66 KG/M2 | HEIGHT: 61 IN | WEIGHT: 120 LBS

## 2024-09-05 DIAGNOSIS — M17.12 PRIMARY OSTEOARTHRITIS OF LEFT KNEE: ICD-10-CM

## 2024-09-05 DIAGNOSIS — R52 PAIN: ICD-10-CM

## 2024-09-05 DIAGNOSIS — G89.29 BILATERAL CHRONIC KNEE PAIN: Primary | ICD-10-CM

## 2024-09-05 DIAGNOSIS — M25.561 BILATERAL CHRONIC KNEE PAIN: Primary | ICD-10-CM

## 2024-09-05 DIAGNOSIS — M25.562 BILATERAL CHRONIC KNEE PAIN: Primary | ICD-10-CM

## 2024-09-05 DIAGNOSIS — M17.11 PRIMARY OSTEOARTHRITIS OF RIGHT KNEE: ICD-10-CM

## 2024-09-05 PROCEDURE — 2500000005 HC RX 250 GENERAL PHARMACY W/O HCPCS: Performed by: PHYSICIAN ASSISTANT

## 2024-09-05 PROCEDURE — 2500000004 HC RX 250 GENERAL PHARMACY W/ HCPCS (ALT 636 FOR OP/ED): Performed by: PHYSICIAN ASSISTANT

## 2024-09-05 PROCEDURE — 73560 X-RAY EXAM OF KNEE 1 OR 2: CPT | Mod: BILATERAL PROCEDURE | Performed by: ORTHOPAEDIC SURGERY

## 2024-09-05 PROCEDURE — 1125F AMNT PAIN NOTED PAIN PRSNT: CPT | Performed by: PHYSICIAN ASSISTANT

## 2024-09-05 PROCEDURE — 1160F RVW MEDS BY RX/DR IN RCRD: CPT | Performed by: PHYSICIAN ASSISTANT

## 2024-09-05 PROCEDURE — 73560 X-RAY EXAM OF KNEE 1 OR 2: CPT | Mod: 50

## 2024-09-05 PROCEDURE — 1036F TOBACCO NON-USER: CPT | Performed by: PHYSICIAN ASSISTANT

## 2024-09-05 PROCEDURE — 1159F MED LIST DOCD IN RCRD: CPT | Performed by: PHYSICIAN ASSISTANT

## 2024-09-05 PROCEDURE — 20610 DRAIN/INJ JOINT/BURSA W/O US: CPT | Mod: 50 | Performed by: PHYSICIAN ASSISTANT

## 2024-09-05 PROCEDURE — 3008F BODY MASS INDEX DOCD: CPT | Performed by: PHYSICIAN ASSISTANT

## 2024-09-05 PROCEDURE — 99213 OFFICE O/P EST LOW 20 MIN: CPT | Performed by: PHYSICIAN ASSISTANT

## 2024-09-05 PROCEDURE — 99213 OFFICE O/P EST LOW 20 MIN: CPT | Mod: 25 | Performed by: PHYSICIAN ASSISTANT

## 2024-09-05 RX ORDER — LIDOCAINE HYDROCHLORIDE 10 MG/ML
1 INJECTION INFILTRATION; PERINEURAL
Status: COMPLETED | OUTPATIENT
Start: 2024-09-05 | End: 2024-09-05

## 2024-09-05 RX ORDER — TRIAMCINOLONE ACETONIDE 40 MG/ML
40 INJECTION, SUSPENSION INTRA-ARTICULAR; INTRAMUSCULAR
Status: COMPLETED | OUTPATIENT
Start: 2024-09-05 | End: 2024-09-05

## 2024-09-05 ASSESSMENT — ENCOUNTER SYMPTOMS
LOSS OF SENSATION IN FEET: 0
OCCASIONAL FEELINGS OF UNSTEADINESS: 0
DEPRESSION: 0

## 2024-09-05 ASSESSMENT — LIFESTYLE VARIABLES
AUDIT TOTAL SCORE: 0
HAS A RELATIVE, FRIEND, DOCTOR, OR ANOTHER HEALTH PROFESSIONAL EXPRESSED CONCERN ABOUT YOUR DRINKING OR SUGGESTED YOU CUT DOWN: NO
HOW MANY STANDARD DRINKS CONTAINING ALCOHOL DO YOU HAVE ON A TYPICAL DAY: PATIENT DOES NOT DRINK
HOW OFTEN DURING THE LAST YEAR HAVE YOU FOUND THAT YOU WERE NOT ABLE TO STOP DRINKING ONCE YOU HAD STARTED: NEVER
HOW OFTEN DURING THE LAST YEAR HAVE YOU BEEN UNABLE TO REMEMBER WHAT HAPPENED THE NIGHT BEFORE BECAUSE YOU HAD BEEN DRINKING: NEVER
HAVE YOU OR SOMEONE ELSE BEEN INJURED AS A RESULT OF YOUR DRINKING: NO
HOW OFTEN DURING THE LAST YEAR HAVE YOU HAD A FEELING OF GUILT OR REMORSE AFTER DRINKING: NEVER
AUDIT-C TOTAL SCORE: 0
HOW OFTEN DO YOU HAVE A DRINK CONTAINING ALCOHOL: NEVER
HOW OFTEN DO YOU HAVE SIX OR MORE DRINKS ON ONE OCCASION: NEVER
HOW OFTEN DURING THE LAST YEAR HAVE YOU FAILED TO DO WHAT WAS NORMALLY EXPECTED FROM YOU BECAUSE OF DRINKING: NEVER
SKIP TO QUESTIONS 9-10: 1
HOW OFTEN DURING THE LAST YEAR HAVE YOU NEEDED AN ALCOHOLIC DRINK FIRST THING IN THE MORNING TO GET YOURSELF GOING AFTER A NIGHT OF HEAVY DRINKING: NEVER

## 2024-09-05 ASSESSMENT — COLUMBIA-SUICIDE SEVERITY RATING SCALE - C-SSRS
6. HAVE YOU EVER DONE ANYTHING, STARTED TO DO ANYTHING, OR PREPARED TO DO ANYTHING TO END YOUR LIFE?: NO
1. IN THE PAST MONTH, HAVE YOU WISHED YOU WERE DEAD OR WISHED YOU COULD GO TO SLEEP AND NOT WAKE UP?: NO
2. HAVE YOU ACTUALLY HAD ANY THOUGHTS OF KILLING YOURSELF?: NO

## 2024-09-05 ASSESSMENT — PAIN DESCRIPTION - DESCRIPTORS
DESCRIPTORS: ACHING
DESCRIPTORS: ACHING

## 2024-09-05 ASSESSMENT — PAIN SCALES - GENERAL
PAINLEVEL: 9
PAINLEVEL_OUTOF10: 9
PAINLEVEL_OUTOF10: 9

## 2024-09-05 ASSESSMENT — PAIN - FUNCTIONAL ASSESSMENT
PAIN_FUNCTIONAL_ASSESSMENT: 0-10
PAIN_FUNCTIONAL_ASSESSMENT: 0-10

## 2024-09-09 ENCOUNTER — SPECIALTY PHARMACY (OUTPATIENT)
Dept: PHARMACY | Facility: CLINIC | Age: 69
End: 2024-09-09

## 2024-09-09 ENCOUNTER — DOCUMENTATION (OUTPATIENT)
Dept: INFUSION THERAPY | Facility: CLINIC | Age: 69
End: 2024-09-09
Payer: MEDICARE

## 2024-11-04 ENCOUNTER — APPOINTMENT (OUTPATIENT)
Dept: RADIOLOGY | Facility: CLINIC | Age: 69
End: 2024-11-04
Payer: MEDICARE

## 2024-11-07 ENCOUNTER — APPOINTMENT (OUTPATIENT)
Dept: RHEUMATOLOGY | Facility: CLINIC | Age: 69
End: 2024-11-07
Payer: MEDICARE

## 2024-11-08 DIAGNOSIS — M05.9 SEROPOSITIVE RHEUMATOID ARTHRITIS: ICD-10-CM

## 2024-11-12 RX ORDER — METHOTREXATE 2.5 MG/1
TABLET ORAL
Qty: 32 TABLET | Refills: 0 | Status: SHIPPED | OUTPATIENT
Start: 2024-11-12

## 2024-11-25 DIAGNOSIS — M05.9 SEROPOSITIVE RHEUMATOID ARTHRITIS: ICD-10-CM

## 2024-11-25 RX ORDER — METHOTREXATE 2.5 MG/1
TABLET ORAL
Qty: 32 TABLET | Refills: 0 | OUTPATIENT
Start: 2024-11-25

## 2024-12-05 ENCOUNTER — APPOINTMENT (OUTPATIENT)
Dept: RHEUMATOLOGY | Facility: CLINIC | Age: 69
End: 2024-12-05
Payer: MEDICARE

## 2024-12-05 DIAGNOSIS — M05.9 RHEUMATOID ARTHRITIS WITH RHEUMATOID FACTOR, UNSPECIFIED: ICD-10-CM

## 2024-12-05 DIAGNOSIS — M05.9 SEROPOSITIVE RHEUMATOID ARTHRITIS: ICD-10-CM

## 2024-12-05 DIAGNOSIS — M05.9 RHEUMATOID ARTHRITIS WITH POSITIVE RHEUMATOID FACTOR, INVOLVING UNSPECIFIED SITE (MULTI): ICD-10-CM

## 2024-12-05 PROCEDURE — 1160F RVW MEDS BY RX/DR IN RCRD: CPT | Performed by: INTERNAL MEDICINE

## 2024-12-05 PROCEDURE — 99214 OFFICE O/P EST MOD 30 MIN: CPT | Performed by: INTERNAL MEDICINE

## 2024-12-05 PROCEDURE — 1159F MED LIST DOCD IN RCRD: CPT | Performed by: INTERNAL MEDICINE

## 2024-12-05 PROCEDURE — 1036F TOBACCO NON-USER: CPT | Performed by: INTERNAL MEDICINE

## 2024-12-05 RX ORDER — HYDROXYCHLOROQUINE SULFATE 200 MG/1
200 TABLET, FILM COATED ORAL DAILY
Qty: 90 TABLET | Refills: 1 | Status: SHIPPED | OUTPATIENT
Start: 2024-12-05 | End: 2025-12-05

## 2024-12-05 RX ORDER — METHOTREXATE 2.5 MG/1
20 TABLET ORAL WEEKLY
Qty: 136 TABLET | Refills: 0 | Status: SHIPPED | OUTPATIENT
Start: 2024-12-05 | End: 2025-04-04

## 2024-12-05 RX ORDER — CELECOXIB 200 MG/1
200 CAPSULE ORAL DAILY PRN
Qty: 30 CAPSULE | Refills: 0 | Status: SHIPPED | OUTPATIENT
Start: 2024-12-05 | End: 2025-01-04

## 2024-12-05 RX ORDER — FOLIC ACID 1 MG/1
1 TABLET ORAL DAILY
Qty: 90 TABLET | Refills: 1 | Status: SHIPPED | OUTPATIENT
Start: 2024-12-05 | End: 2025-03-05

## 2024-12-05 NOTE — PROGRESS NOTES
Follow-up Rheumatology Patient Visit    Chief Complaint:  Noé Valente is a 69 y.o. female presenting today for No chief complaint on file..    History of Presenting Problem:   69-year-old female with history of DJD of the cervical spine status post fusion presents with various joint complaint. Patient reported she started to have persistent joint complaints done in November 2021 mostly in her bilateral shoulders, it was initially attributed to her degenerative spine disease she underwent spinal surgery but symptoms have persisted and now she is having stiffness in her hands and knees she reports painful range of motion in her bilateral shoulder. And her wrists and hands. She reports occasional stiffness in her knees.  She has tried taking Aleve 2 pills twice a day which offers 50% relief.She is also on gabapentin     Today she reports about good  improvement on current Regimen of methotrexate and hydroxychloroquine.  She is tolerating hydroxychloroquine 200 mg twice a day she is taking methotrexate 8 pills weekly with daily folic acid.  She is not longer taking Celebrex daily.  She did stop  mg BID about a month ago because it was causing him some reflux issues per patient this is currently resolved.  She denies significant morning stiffness, she has good range of motion.  Problem List:   Patient Active Problem List   Diagnosis    Age-related osteoporosis without current pathological fracture    Allergic rhinitis    Hyperlipidemia    Bilateral chronic knee pain    Osteoarthritis of cervical spine    Primary osteoarthritis of left knee    Osteoarthritis of right knee    Rheumatoid arthritis    Osteopenia of multiple sites       Past Medical History:   Past Medical History:   Diagnosis Date    Allergic     Anemia     Frozen shoulder     Knee pain, bilateral     Personal history of nicotine dependence     History of nicotine dependence    Personal history of other diseases of the respiratory system     History  of asthma    Personal history of other diseases of the respiratory system     History of allergic rhinitis    Postmenopausal atrophic vaginitis 09/08/2023    Pulmonary fibrosis, unspecified (Multi)     Postinflammatory pulmonary fibrosis    Rheumatoid arthritis (Multi) 2023       Surgical History:   Past Surgical History:   Procedure Laterality Date    CERVICAL SPINE SURGERY      C5 fusion    SINUS SURGERY  06/10/2013    Sinus Surgery        Allergies:   Allergies   Allergen Reactions    Erythromycin Unknown    Sulfa (Sulfonamide Antibiotics) Unknown       Medications:   Current Outpatient Medications:     atorvastatin (Lipitor) 20 mg tablet, Take 1 tablet (20 mg) by mouth once daily., Disp: 90 tablet, Rfl: 3    azelastine HCl (AZELASTINE NASL), Administer 2 sprays into affected nostril(s) 2 times a day., Disp: , Rfl:     budesonide-formoteroL (Symbicort) 80-4.5 mcg/actuation inhaler, Inhale 2 puffs 2 times a day., Disp: , Rfl:     celecoxib (CeleBREX) 200 mg capsule, Take 1 capsule (200 mg) by mouth once daily as needed for mild pain (1 - 3)., Disp: 30 capsule, Rfl: 0    folic acid (Folvite) 1 mg tablet, Take 1 tablet (1 mg) by mouth once daily., Disp: 90 tablet, Rfl: 1    hydroxychloroquine (Plaquenil) 200 mg tablet, Take 1 tablet (200 mg) by mouth once daily., Disp: 90 tablet, Rfl: 1    methotrexate (Trexall) 2.5 mg tablet, Take 8 tablets (20 mg total) by mouth 1 (one) time per week.  Follow directions carefully, and ask to explain any part you do not understand. Take exactly as directed., Disp: 136 tablet, Rfl: 0    pantoprazole (ProtoNix) 40 mg EC tablet, Take 1 tablet (40 mg) by mouth once daily in the morning. Take before meals. Do not crush, chew, or split., Disp: 90 tablet, Rfl: 1      Objective   Physical Examination:    Visit Vitals  OB Status Postmenopausal   Smoking Status Former      Gen: NAD, A&O x 3  HEENT: clear sclera and conjunctiva,       Procedures :None    Orders:  Orders Placed This Encounter    Procedures    CBC and Auto Differential    Comprehensive Metabolic Panel    C-Reactive Protein    Sedimentation Rate        Provider Impression:   Assessment/Plan   Encounter Diagnoses   Name Primary?    Seropositive rheumatoid arthritis     Rheumatoid arthritis with positive rheumatoid factor, involving unspecified site (Multi)     Rheumatoid arthritis with rheumatoid factor, unspecified        69-year-old female with history of DJD of  cervical spine status post fusion presents with various joint complaint.On exam she has some inflammatory findings in her hands concerning for rheumatoid arthritis. Blood work shows positive rheumatoid factor and CCP  -Recommend restarting hydroxychloroquine  but only taking 200 mg during the day  -Continue with methotrexate, continue with 8 pills weekly with folic acid daily . Holding  off Enbrel for now given improvement with hydroxychloroquine and also cost patient  -Continue with Celebrex 200 mg daily as needed, recommend Pepcid for GI prophylaxis  -Check routine labs   -Hx of osteoporosis on Fosamax per gynecology  -Follow-up in 4 months

## 2024-12-09 ENCOUNTER — HOSPITAL ENCOUNTER (OUTPATIENT)
Dept: RADIOLOGY | Facility: CLINIC | Age: 69
Discharge: HOME | End: 2024-12-09
Payer: MEDICARE

## 2024-12-09 DIAGNOSIS — M81.0 AGE-RELATED OSTEOPOROSIS WITHOUT CURRENT PATHOLOGICAL FRACTURE: ICD-10-CM

## 2024-12-09 PROCEDURE — 77080 DXA BONE DENSITY AXIAL: CPT | Performed by: RADIOLOGY

## 2024-12-09 PROCEDURE — 77080 DXA BONE DENSITY AXIAL: CPT

## 2025-01-02 DIAGNOSIS — M05.9 RHEUMATOID ARTHRITIS WITH POSITIVE RHEUMATOID FACTOR, INVOLVING UNSPECIFIED SITE (MULTI): ICD-10-CM

## 2025-01-02 RX ORDER — CELECOXIB 200 MG/1
200 CAPSULE ORAL DAILY PRN
Qty: 30 CAPSULE | Refills: 0 | Status: SHIPPED | OUTPATIENT
Start: 2025-01-02 | End: 2025-02-01

## 2025-01-03 ENCOUNTER — APPOINTMENT (OUTPATIENT)
Dept: PRIMARY CARE | Facility: CLINIC | Age: 70
End: 2025-01-03
Payer: MEDICARE

## 2025-01-03 ENCOUNTER — LAB (OUTPATIENT)
Dept: LAB | Facility: LAB | Age: 70
End: 2025-01-03
Payer: MEDICARE

## 2025-01-03 VITALS
WEIGHT: 122.7 LBS | BODY MASS INDEX: 23.17 KG/M2 | HEART RATE: 79 BPM | TEMPERATURE: 97.3 F | DIASTOLIC BLOOD PRESSURE: 74 MMHG | SYSTOLIC BLOOD PRESSURE: 123 MMHG | HEIGHT: 61 IN

## 2025-01-03 DIAGNOSIS — Z00.00 ROUTINE GENERAL MEDICAL EXAMINATION AT HEALTH CARE FACILITY: Primary | ICD-10-CM

## 2025-01-03 DIAGNOSIS — Z00.00 ROUTINE GENERAL MEDICAL EXAMINATION AT HEALTH CARE FACILITY: ICD-10-CM

## 2025-01-03 DIAGNOSIS — R63.8 SALT CRAVING: ICD-10-CM

## 2025-01-03 DIAGNOSIS — M81.0 AGE-RELATED OSTEOPOROSIS WITHOUT CURRENT PATHOLOGICAL FRACTURE: ICD-10-CM

## 2025-01-03 DIAGNOSIS — K21.9 GASTROESOPHAGEAL REFLUX DISEASE WITHOUT ESOPHAGITIS: ICD-10-CM

## 2025-01-03 DIAGNOSIS — M05.9 SEROPOSITIVE RHEUMATOID ARTHRITIS: ICD-10-CM

## 2025-01-03 DIAGNOSIS — E78.2 MIXED HYPERLIPIDEMIA: ICD-10-CM

## 2025-01-03 DIAGNOSIS — E55.9 VITAMIN D DEFICIENCY: ICD-10-CM

## 2025-01-03 DIAGNOSIS — Z12.31 SCREENING MAMMOGRAM FOR BREAST CANCER: ICD-10-CM

## 2025-01-03 DIAGNOSIS — Z12.11 ENCOUNTER FOR SCREENING FOR MALIGNANT NEOPLASM OF COLON: ICD-10-CM

## 2025-01-03 DIAGNOSIS — R73.02 IGT (IMPAIRED GLUCOSE TOLERANCE): ICD-10-CM

## 2025-01-03 LAB
25(OH)D3 SERPL-MCNC: 34 NG/ML (ref 30–100)
ALBUMIN SERPL BCP-MCNC: 4.1 G/DL (ref 3.4–5)
ALP SERPL-CCNC: 79 U/L (ref 33–136)
ALT SERPL W P-5'-P-CCNC: 30 U/L (ref 7–45)
ANION GAP SERPL CALC-SCNC: 10 MMOL/L (ref 10–20)
AST SERPL W P-5'-P-CCNC: 23 U/L (ref 9–39)
BASOPHILS # BLD AUTO: 0.02 X10*3/UL (ref 0–0.1)
BASOPHILS NFR BLD AUTO: 0.6 %
BILIRUB SERPL-MCNC: 0.9 MG/DL (ref 0–1.2)
BUN SERPL-MCNC: 14 MG/DL (ref 6–23)
CALCIUM SERPL-MCNC: 9.4 MG/DL (ref 8.6–10.3)
CHLORIDE SERPL-SCNC: 104 MMOL/L (ref 98–107)
CHOLEST SERPL-MCNC: 169 MG/DL (ref 0–199)
CHOLESTEROL/HDL RATIO: 2
CO2 SERPL-SCNC: 31 MMOL/L (ref 21–32)
CORTIS SERPL-MCNC: 6.6 UG/DL (ref 2.5–20)
CREAT SERPL-MCNC: 0.54 MG/DL (ref 0.5–1.05)
CRP SERPL-MCNC: 0.14 MG/DL
EGFRCR SERPLBLD CKD-EPI 2021: >90 ML/MIN/1.73M*2
EOSINOPHIL # BLD AUTO: 0.09 X10*3/UL (ref 0–0.7)
EOSINOPHIL NFR BLD AUTO: 2.5 %
ERYTHROCYTE [DISTWIDTH] IN BLOOD BY AUTOMATED COUNT: 12.3 % (ref 11.5–14.5)
ERYTHROCYTE [SEDIMENTATION RATE] IN BLOOD BY WESTERGREN METHOD: 6 MM/H (ref 0–30)
EST. AVERAGE GLUCOSE BLD GHB EST-MCNC: 108 MG/DL
GLUCOSE SERPL-MCNC: 94 MG/DL (ref 74–99)
HBA1C MFR BLD: 5.4 %
HCT VFR BLD AUTO: 41.4 % (ref 36–46)
HDLC SERPL-MCNC: 84.1 MG/DL
HGB BLD-MCNC: 13.8 G/DL (ref 12–16)
IMM GRANULOCYTES # BLD AUTO: 0.01 X10*3/UL (ref 0–0.7)
IMM GRANULOCYTES NFR BLD AUTO: 0.3 % (ref 0–0.9)
LDLC SERPL CALC-MCNC: 67 MG/DL
LYMPHOCYTES # BLD AUTO: 0.96 X10*3/UL (ref 1.2–4.8)
LYMPHOCYTES NFR BLD AUTO: 27.2 %
MCH RBC QN AUTO: 33.3 PG (ref 26–34)
MCHC RBC AUTO-ENTMCNC: 33.3 G/DL (ref 32–36)
MCV RBC AUTO: 100 FL (ref 80–100)
MONOCYTES # BLD AUTO: 0.28 X10*3/UL (ref 0.1–1)
MONOCYTES NFR BLD AUTO: 7.9 %
NEUTROPHILS # BLD AUTO: 2.17 X10*3/UL (ref 1.2–7.7)
NEUTROPHILS NFR BLD AUTO: 61.5 %
NON HDL CHOLESTEROL: 85 MG/DL (ref 0–149)
NRBC BLD-RTO: 0 /100 WBCS (ref 0–0)
PLATELET # BLD AUTO: 246 X10*3/UL (ref 150–450)
POTASSIUM SERPL-SCNC: 4.6 MMOL/L (ref 3.5–5.3)
PROT SERPL-MCNC: 7 G/DL (ref 6.4–8.2)
RBC # BLD AUTO: 4.15 X10*6/UL (ref 4–5.2)
SODIUM SERPL-SCNC: 140 MMOL/L (ref 136–145)
TRIGL SERPL-MCNC: 90 MG/DL (ref 0–149)
VLDL: 18 MG/DL (ref 0–40)
WBC # BLD AUTO: 3.5 X10*3/UL (ref 4.4–11.3)

## 2025-01-03 PROCEDURE — 99215 OFFICE O/P EST HI 40 MIN: CPT | Performed by: INTERNAL MEDICINE

## 2025-01-03 PROCEDURE — 85652 RBC SED RATE AUTOMATED: CPT

## 2025-01-03 PROCEDURE — 80061 LIPID PANEL: CPT

## 2025-01-03 PROCEDURE — 83036 HEMOGLOBIN GLYCOSYLATED A1C: CPT

## 2025-01-03 PROCEDURE — 80053 COMPREHEN METABOLIC PANEL: CPT

## 2025-01-03 PROCEDURE — 82533 TOTAL CORTISOL: CPT

## 2025-01-03 PROCEDURE — 85025 COMPLETE CBC W/AUTO DIFF WBC: CPT

## 2025-01-03 PROCEDURE — 86140 C-REACTIVE PROTEIN: CPT

## 2025-01-03 PROCEDURE — 82306 VITAMIN D 25 HYDROXY: CPT

## 2025-01-03 RX ORDER — ZOLEDRONIC ACID 5 MG/100ML
5 INJECTION, SOLUTION INTRAVENOUS ONCE
OUTPATIENT
Start: 2025-01-03

## 2025-01-03 RX ORDER — MOMETASONE FUROATE AND FORMOTEROL FUMARATE DIHYDRATE 200; 5 UG/1; UG/1
AEROSOL RESPIRATORY (INHALATION)
COMMUNITY
Start: 2024-11-21

## 2025-01-03 RX ORDER — ATORVASTATIN CALCIUM 20 MG/1
20 TABLET, FILM COATED ORAL DAILY
Qty: 90 TABLET | Refills: 3 | Status: SHIPPED | OUTPATIENT
Start: 2025-01-03 | End: 2026-01-03

## 2025-01-03 RX ORDER — DIPHENHYDRAMINE HYDROCHLORIDE 50 MG/ML
50 INJECTION INTRAMUSCULAR; INTRAVENOUS AS NEEDED
OUTPATIENT
Start: 2025-01-03

## 2025-01-03 RX ORDER — ALENDRONATE SODIUM 70 MG/1
1 TABLET ORAL
COMMUNITY
Start: 2024-11-25 | End: 2025-01-03 | Stop reason: ALTCHOICE

## 2025-01-03 RX ORDER — ACETAMINOPHEN 325 MG/1
650 TABLET ORAL ONCE
OUTPATIENT
Start: 2025-01-03

## 2025-01-03 RX ORDER — FAMOTIDINE 10 MG/ML
20 INJECTION INTRAVENOUS ONCE AS NEEDED
OUTPATIENT
Start: 2025-01-03

## 2025-01-03 RX ORDER — EPINEPHRINE 0.3 MG/.3ML
0.3 INJECTION SUBCUTANEOUS EVERY 5 MIN PRN
OUTPATIENT
Start: 2025-01-03

## 2025-01-03 RX ORDER — ALBUTEROL SULFATE 0.83 MG/ML
3 SOLUTION RESPIRATORY (INHALATION) AS NEEDED
OUTPATIENT
Start: 2025-01-03

## 2025-01-03 ASSESSMENT — ENCOUNTER SYMPTOMS
FEVER: 0
SHORTNESS OF BREATH: 0
COUGH: 0
CHILLS: 0
PALPITATIONS: 0
POLYDIPSIA: 0

## 2025-01-03 ASSESSMENT — PATIENT HEALTH QUESTIONNAIRE - PHQ9
SUM OF ALL RESPONSES TO PHQ9 QUESTIONS 1 AND 2: 0
2. FEELING DOWN, DEPRESSED OR HOPELESS: NOT AT ALL
1. LITTLE INTEREST OR PLEASURE IN DOING THINGS: NOT AT ALL

## 2025-01-03 ASSESSMENT — ACTIVITIES OF DAILY LIVING (ADL)
TAKING_MEDICATION: INDEPENDENT
DRESSING: INDEPENDENT
BATHING: INDEPENDENT
DOING_HOUSEWORK: INDEPENDENT
GROCERY_SHOPPING: INDEPENDENT
DRESSING: INDEPENDENT
MANAGING_FINANCES: INDEPENDENT
BATHING: INDEPENDENT

## 2025-01-03 NOTE — ASSESSMENT & PLAN NOTE
Orders:    atorvastatin (Lipitor) 20 mg tablet; Take 1 tablet (20 mg) by mouth once daily.    Lipid Panel; Future    Vitamin D 25-Hydroxy,Total (for eval of Vitamin D levels); Future    CBC; Future    Comprehensive Metabolic Panel; Future    Hemoglobin A1C; Future

## 2025-01-03 NOTE — PROGRESS NOTES
"Subjective   Reason for Visit: Noé Valente is an 69 y.o. female here for a Medicare Wellness visit.     Past Medical, Surgical, and Family History reviewed and updated in chart.    Reviewed all medications by prescribing practitioner or clinical pharmacist (such as prescriptions, OTCs, herbal therapies and supplements) and documented in the medical record.    Patient presents today for an annual wellness exam    Medical history and surgical history updated today  Medication list reconciled and updated  Patient denies vision issues at this time  Patient denies hearing issues at this time  Follows with a dentist: Yes    Patient counseled about available preventative health vaccinations and provided with opportunity to update them with our office or through prescription to preferred pharmacy.    Reviewed and discussed preventative health screening recommendations for colon cancer: due this year- ordered    Reviewed and discussed preventative health recommendations for screening for cervical cancer and breast cancer: ordered    Osteoporosis hx, on oral bisphos therapy but significant GERD hx.       Patient Care Team:  Kevin Malave MD as PCP - General (Internal Medicine)     Review of Systems   Constitutional:  Negative for chills and fever.   Respiratory:  Negative for cough and shortness of breath.    Cardiovascular:  Negative for chest pain and palpitations.   Endocrine: Negative for polydipsia and polyuria.       Objective   Vitals:  /74   Pulse 79   Temp 36.3 °C (97.3 °F) (Temporal)   Ht 1.549 m (5' 1\")   Wt 55.7 kg (122 lb 11.2 oz)   BMI 23.18 kg/m²       Physical Exam  Constitutional:       Appearance: Normal appearance.   HENT:      Head: Normocephalic and atraumatic.      Right Ear: Tympanic membrane normal.      Left Ear: Tympanic membrane normal.      Nose: Nose normal.      Mouth/Throat:      Mouth: Mucous membranes are moist.   Eyes:      Extraocular Movements: Extraocular movements intact.    "   Conjunctiva/sclera: Conjunctivae normal.      Pupils: Pupils are equal, round, and reactive to light.   Neck:      Thyroid: No thyroid mass, thyromegaly or thyroid tenderness.      Vascular: No carotid bruit.   Cardiovascular:      Rate and Rhythm: Normal rate and regular rhythm.      Heart sounds: No murmur heard.     No friction rub. No gallop.   Pulmonary:      Effort: Pulmonary effort is normal. No respiratory distress.      Breath sounds: No wheezing, rhonchi or rales.   Abdominal:      General: Bowel sounds are normal.      Palpations: Abdomen is soft.      Tenderness: There is no abdominal tenderness. There is no guarding.      Hernia: No hernia is present.   Musculoskeletal:      Cervical back: Neck supple. No tenderness.      Right lower leg: No edema.      Left lower leg: No edema.   Lymphadenopathy:      Cervical: No cervical adenopathy.   Skin:     Coloration: Skin is not jaundiced.   Neurological:      General: No focal deficit present.      Mental Status: She is alert and oriented to person, place, and time.   Psychiatric:         Mood and Affect: Mood normal.       Assessment & Plan  Routine general medical examination at health care facility    Orders:  •  1 Year Follow Up In Primary Care - Wellness Exam; Future  •  Lipid Panel; Future  •  Vitamin D 25-Hydroxy,Total (for eval of Vitamin D levels); Future  •  CBC; Future  •  Comprehensive Metabolic Panel; Future  •  Hemoglobin A1C; Future    Mixed hyperlipidemia    Orders:  •  atorvastatin (Lipitor) 20 mg tablet; Take 1 tablet (20 mg) by mouth once daily.  •  Lipid Panel; Future  •  Vitamin D 25-Hydroxy,Total (for eval of Vitamin D levels); Future  •  CBC; Future  •  Comprehensive Metabolic Panel; Future  •  Hemoglobin A1C; Future    Age-related osteoporosis without current pathological fracture    Orders:  •  Referral to Clinical Pharmacy; Future  •  Lipid Panel; Future  •  Vitamin D 25-Hydroxy,Total (for eval of Vitamin D levels); Future  •  CBC;  Future  •  Comprehensive Metabolic Panel; Future  •  Hemoglobin A1C; Future    Gastroesophageal reflux disease without esophagitis         Encounter for screening for malignant neoplasm of colon    Orders:  •  Colonoscopy Screening; Average Risk Patient; Future    Screening mammogram for breast cancer    Orders:  •  BI mammo bilateral screening tomosynthesis; Future    IGT (impaired glucose tolerance)    Orders:  •  Lipid Panel; Future  •  Vitamin D 25-Hydroxy,Total (for eval of Vitamin D levels); Future  •  CBC; Future  •  Comprehensive Metabolic Panel; Future  •  Hemoglobin A1C; Future    Vitamin D deficiency    Orders:  •  Lipid Panel; Future  •  Vitamin D 25-Hydroxy,Total (for eval of Vitamin D levels); Future  •  CBC; Future  •  Comprehensive Metabolic Panel; Future  •  Hemoglobin A1C; Future    Salt craving    Orders:  •  Cortisol; Future

## 2025-01-03 NOTE — ASSESSMENT & PLAN NOTE
Orders:    Referral to Clinical Pharmacy; Future    Lipid Panel; Future    Vitamin D 25-Hydroxy,Total (for eval of Vitamin D levels); Future    CBC; Future    Comprehensive Metabolic Panel; Future    Hemoglobin A1C; Future

## 2025-01-05 DIAGNOSIS — M81.0 AGE-RELATED OSTEOPOROSIS WITHOUT CURRENT PATHOLOGICAL FRACTURE: ICD-10-CM

## 2025-01-06 DIAGNOSIS — M05.9 SEROPOSITIVE RHEUMATOID ARTHRITIS: Primary | ICD-10-CM

## 2025-01-07 RX ORDER — ALENDRONATE SODIUM 70 MG/1
TABLET ORAL
Qty: 12 TABLET | Refills: 3 | Status: SHIPPED | OUTPATIENT
Start: 2025-01-07

## 2025-01-10 ENCOUNTER — APPOINTMENT (OUTPATIENT)
Dept: PHARMACY | Facility: HOSPITAL | Age: 70
End: 2025-01-10
Payer: MEDICARE

## 2025-04-03 ENCOUNTER — APPOINTMENT (OUTPATIENT)
Dept: RHEUMATOLOGY | Facility: CLINIC | Age: 70
End: 2025-04-03
Payer: MEDICARE

## 2025-04-03 VITALS
HEART RATE: 79 BPM | WEIGHT: 126 LBS | OXYGEN SATURATION: 97 % | BODY MASS INDEX: 23.79 KG/M2 | SYSTOLIC BLOOD PRESSURE: 134 MMHG | HEIGHT: 61 IN | DIASTOLIC BLOOD PRESSURE: 93 MMHG

## 2025-04-03 DIAGNOSIS — M81.0 AGE-RELATED OSTEOPOROSIS WITHOUT CURRENT PATHOLOGICAL FRACTURE: ICD-10-CM

## 2025-04-03 DIAGNOSIS — M05.9 RHEUMATOID ARTHRITIS WITH POSITIVE RHEUMATOID FACTOR, INVOLVING UNSPECIFIED SITE (MULTI): Primary | ICD-10-CM

## 2025-04-03 PROCEDURE — 99214 OFFICE O/P EST MOD 30 MIN: CPT | Performed by: INTERNAL MEDICINE

## 2025-04-03 PROCEDURE — 1036F TOBACCO NON-USER: CPT | Performed by: INTERNAL MEDICINE

## 2025-04-03 PROCEDURE — 1126F AMNT PAIN NOTED NONE PRSNT: CPT | Performed by: INTERNAL MEDICINE

## 2025-04-03 PROCEDURE — 1159F MED LIST DOCD IN RCRD: CPT | Performed by: INTERNAL MEDICINE

## 2025-04-03 PROCEDURE — 3008F BODY MASS INDEX DOCD: CPT | Performed by: INTERNAL MEDICINE

## 2025-04-03 PROCEDURE — 1160F RVW MEDS BY RX/DR IN RCRD: CPT | Performed by: INTERNAL MEDICINE

## 2025-04-03 ASSESSMENT — PATIENT HEALTH QUESTIONNAIRE - PHQ9
1. LITTLE INTEREST OR PLEASURE IN DOING THINGS: NOT AT ALL
2. FEELING DOWN, DEPRESSED OR HOPELESS: NOT AT ALL
SUM OF ALL RESPONSES TO PHQ9 QUESTIONS 1 AND 2: 0

## 2025-04-03 ASSESSMENT — PAIN SCALES - GENERAL: PAINLEVEL_OUTOF10: 0-NO PAIN

## 2025-04-03 ASSESSMENT — ENCOUNTER SYMPTOMS
OCCASIONAL FEELINGS OF UNSTEADINESS: 0
LOSS OF SENSATION IN FEET: 0
DEPRESSION: 0

## 2025-04-03 NOTE — PROGRESS NOTES
Follow-up Rheumatology Patient Visit    Chief Complaint:  Noé Valente is a 69 y.o. female presenting today for Follow-up.    History of Presenting Problem:   69-year-old female with history of DJD of the cervical spine status post fusion presents with various joint complaint. Patient reported she started to have persistent joint complaints done in November 2021 mostly in her bilateral shoulders, it was initially attributed to her degenerative spine disease she underwent spinal surgery but symptoms have persisted and now she is having stiffness in her hands and knees she reports painful range of motion in her bilateral shoulder. And her wrists and hands. She reports occasional stiffness in her knees.  She has tried taking Aleve 2 pills twice a day which offers 50% relief.She is also on gabapentin     Today she reports about good  improvement on current Regimen of methotrexate and hydroxychloroquine.  She is tolerating hydroxychloroquine 200 mg twice a day she is taking methotrexate 8 pills weekly with daily folic acid.  She is not longer taking Celebrex daily.  She did stop  mg BID about a month ago because it was causing him some reflux issues per patient this is currently resolved.  She denies significant morning stiffness, she has good range of motion.  Problem List:   Patient Active Problem List   Diagnosis    Age-related osteoporosis without current pathological fracture    Allergic rhinitis    Hyperlipidemia    Bilateral chronic knee pain    Osteoarthritis of cervical spine    Primary osteoarthritis of left knee    Osteoarthritis of right knee    Rheumatoid arthritis    Osteopenia of multiple sites    Gastroesophageal reflux disease without esophagitis       Past Medical History:   Past Medical History:   Diagnosis Date    Allergic     Anemia     Frozen shoulder     Knee pain, bilateral     Personal history of nicotine dependence     History of nicotine dependence    Personal history of other diseases of  "the respiratory system     History of asthma    Personal history of other diseases of the respiratory system     History of allergic rhinitis    Postmenopausal atrophic vaginitis 09/08/2023    Pulmonary fibrosis, unspecified (Multi)     Postinflammatory pulmonary fibrosis    Rheumatoid arthritis 2023       Surgical History:   Past Surgical History:   Procedure Laterality Date    CERVICAL SPINE SURGERY      C5 fusion    SINUS SURGERY  06/10/2013    Sinus Surgery        Allergies:   Allergies   Allergen Reactions    Erythromycin Unknown    Sulfa (Sulfonamide Antibiotics) Unknown       Medications:   Current Outpatient Medications:     alendronate (Fosamax) 70 mg tablet, TAKE 1 TABLET BY MOUTH WEEKLY, Disp: 12 tablet, Rfl: 3    atorvastatin (Lipitor) 20 mg tablet, Take 1 tablet (20 mg) by mouth once daily., Disp: 90 tablet, Rfl: 3    azelastine HCl (AZELASTINE NASL), Administer 2 sprays into affected nostril(s) 2 times a day., Disp: , Rfl:     Dulera 200-5 mcg/actuation inhaler, INHALE 2 PUFF(S) EVERY 12 HOURS, Disp: , Rfl:     hydroxychloroquine (Plaquenil) 200 mg tablet, Take 1 tablet (200 mg) by mouth once daily., Disp: 90 tablet, Rfl: 1    methotrexate (Trexall) 2.5 mg tablet, Take 8 tablets (20 mg total) by mouth 1 (one) time per week.  Follow directions carefully, and ask to explain any part you do not understand. Take exactly as directed., Disp: 136 tablet, Rfl: 0    pantoprazole (ProtoNix) 40 mg EC tablet, Take 1 tablet (40 mg) by mouth once daily in the morning. Take before meals. Do not crush, chew, or split., Disp: 90 tablet, Rfl: 1      Objective   Physical Examination:    Visit Vitals  BP (!) 134/93   Pulse 79   Ht 1.549 m (5' 1\")   Wt 57.2 kg (126 lb)   SpO2 97%   BMI 23.81 kg/m²   OB Status Postmenopausal   Smoking Status Former   BSA 1.57 m²      Gen: NAD, A&O x 3  HEENT: clear sclera and conjunctiva,       Procedures :None    Orders:  Orders Placed This Encounter   Procedures    CBC and Auto " Differential    C-Reactive Protein    Sedimentation Rate    Hepatic Function Panel    Creatinine        Provider Impression:   Assessment/Plan   Encounter Diagnoses   Name Primary?    Rheumatoid arthritis with positive rheumatoid factor, involving unspecified site (Multi) Yes    Age-related osteoporosis without current pathological fracture          69-year-old female with history of DJD of  cervical spine status post fusion presents with various joint complaint.On exam she has some inflammatory findings in her hands concerning for rheumatoid arthritis. Blood work shows positive rheumatoid factor and CCP  -Recommend restarting hydroxychloroquine  but only taking 200 mg during the day  -Continue with methotrexate, continue with 7 pills weekly with folic acid daily .   -Continue with Celebrex 200 mg daily as needed, recommend Pepcid for GI prophylaxis  -Check routine labs   -Hx of osteoporosis on Fosamax  but recently was been to infusion therapy per PCP, patient mention she has been on Fosamax for about 3 years.  Patient was having issues with acid reflux, will reach out to PCP on managing this.  -check routine labs   -Follow-up in 4 months however patient is planning to move to [place] for short-term, at least 8 months

## 2025-04-04 ENCOUNTER — HOSPITAL ENCOUNTER (OUTPATIENT)
Dept: RADIOLOGY | Facility: HOSPITAL | Age: 70
Discharge: HOME | End: 2025-04-04
Payer: MEDICARE

## 2025-04-04 VITALS — HEIGHT: 61 IN | WEIGHT: 122 LBS | BODY MASS INDEX: 23.03 KG/M2

## 2025-04-04 DIAGNOSIS — Z12.31 SCREENING MAMMOGRAM FOR BREAST CANCER: ICD-10-CM

## 2025-04-04 LAB
ALBUMIN SERPL-MCNC: 4.2 G/DL (ref 3.6–5.1)
ALBUMIN/GLOB SERPL: 1.5 (CALC) (ref 1–2.5)
ALP SERPL-CCNC: 80 U/L (ref 37–153)
ALT SERPL-CCNC: 14 U/L (ref 6–29)
AST SERPL-CCNC: 17 U/L (ref 10–35)
BASOPHILS # BLD AUTO: 11 CELLS/UL (ref 0–200)
BASOPHILS NFR BLD AUTO: 0.3 %
BILIRUB DIRECT SERPL-MCNC: 0.1 MG/DL
BILIRUB INDIRECT SERPL-MCNC: 0.5 MG/DL (CALC) (ref 0.2–1.2)
BILIRUB SERPL-MCNC: 0.6 MG/DL (ref 0.2–1.2)
CREAT SERPL-MCNC: 0.49 MG/DL (ref 0.6–1)
CRP SERPL-MCNC: 3.9 MG/L
EGFRCR SERPLBLD CKD-EPI 2021: 101 ML/MIN/1.73M2
EOSINOPHIL # BLD AUTO: 152 CELLS/UL (ref 15–500)
EOSINOPHIL NFR BLD AUTO: 4.1 %
ERYTHROCYTE [DISTWIDTH] IN BLOOD BY AUTOMATED COUNT: 12.7 % (ref 11–15)
ERYTHROCYTE [SEDIMENTATION RATE] IN BLOOD BY WESTERGREN METHOD: NORMAL MM/H
GLOBULIN SER CALC-MCNC: 2.8 G/DL (CALC) (ref 1.9–3.7)
HCT VFR BLD AUTO: 38.4 % (ref 35–45)
HGB BLD-MCNC: 13 G/DL (ref 11.7–15.5)
LYMPHOCYTES # BLD AUTO: 1166 CELLS/UL (ref 850–3900)
LYMPHOCYTES NFR BLD AUTO: 31.5 %
MCH RBC QN AUTO: 33.4 PG (ref 27–33)
MCHC RBC AUTO-ENTMCNC: 33.9 G/DL (ref 32–36)
MCV RBC AUTO: 98.7 FL (ref 80–100)
MONOCYTES # BLD AUTO: 514 CELLS/UL (ref 200–950)
MONOCYTES NFR BLD AUTO: 13.9 %
NEUTROPHILS # BLD AUTO: 1857 CELLS/UL (ref 1500–7800)
NEUTROPHILS NFR BLD AUTO: 50.2 %
PLATELET # BLD AUTO: 248 THOUSAND/UL (ref 140–400)
PMV BLD REES-ECKER: 9.3 FL (ref 7.5–12.5)
PROT SERPL-MCNC: 7 G/DL (ref 6.1–8.1)
RBC # BLD AUTO: 3.89 MILLION/UL (ref 3.8–5.1)
SERVICE CMNT-IMP: ABNORMAL
WBC # BLD AUTO: 3.7 THOUSAND/UL (ref 3.8–10.8)

## 2025-04-04 PROCEDURE — 77063 BREAST TOMOSYNTHESIS BI: CPT

## 2025-04-06 DIAGNOSIS — M05.9 SEROPOSITIVE RHEUMATOID ARTHRITIS: ICD-10-CM

## 2025-04-07 ENCOUNTER — APPOINTMENT (OUTPATIENT)
Dept: INFUSION THERAPY | Facility: CLINIC | Age: 70
End: 2025-04-07
Payer: MEDICARE

## 2025-04-07 DIAGNOSIS — M05.9 SEROPOSITIVE RHEUMATOID ARTHRITIS: ICD-10-CM

## 2025-04-07 RX ORDER — METHOTREXATE 2.5 MG/1
20 TABLET ORAL WEEKLY
Qty: 96 TABLET | Refills: 0 | Status: SHIPPED | OUTPATIENT
Start: 2025-04-07 | End: 2025-07-06

## 2025-04-07 RX ORDER — HYDROXYCHLOROQUINE SULFATE 200 MG/1
200 TABLET, FILM COATED ORAL DAILY
Qty: 90 TABLET | Refills: 0 | Status: SHIPPED | OUTPATIENT
Start: 2025-04-07 | End: 2026-04-07

## 2025-04-07 RX ORDER — METHOTREXATE 2.5 MG/1
20 TABLET ORAL WEEKLY
Qty: 96 TABLET | Refills: 1 | OUTPATIENT
Start: 2025-04-07

## 2025-04-08 ASSESSMENT — ENCOUNTER SYMPTOMS
ADENOPATHY: 0
ACTIVITY CHANGE: 0
SHORTNESS OF BREATH: 0
DYSURIA: 0
DIFFICULTY URINATING: 0
CHEST TIGHTNESS: 0
ABDOMINAL PAIN: 0
FATIGUE: 0
ABDOMINAL DISTENTION: 0
COLOR CHANGE: 0
UNEXPECTED WEIGHT CHANGE: 0
DIZZINESS: 0
WEAKNESS: 0
JOINT SWELLING: 0
HEADACHES: 0

## 2025-04-09 ENCOUNTER — OFFICE VISIT (OUTPATIENT)
Dept: OBSTETRICS AND GYNECOLOGY | Facility: CLINIC | Age: 70
End: 2025-04-09
Payer: MEDICARE

## 2025-04-09 ENCOUNTER — APPOINTMENT (OUTPATIENT)
Dept: RHEUMATOLOGY | Facility: CLINIC | Age: 70
End: 2025-04-09
Payer: MEDICARE

## 2025-04-09 VITALS
HEIGHT: 61 IN | SYSTOLIC BLOOD PRESSURE: 122 MMHG | WEIGHT: 125.8 LBS | BODY MASS INDEX: 23.75 KG/M2 | DIASTOLIC BLOOD PRESSURE: 68 MMHG

## 2025-04-09 DIAGNOSIS — N95.1 FEMALE CLIMACTERIC STATE: Primary | ICD-10-CM

## 2025-04-09 DIAGNOSIS — Z12.31 ENCOUNTER FOR SCREENING MAMMOGRAM FOR BREAST CANCER: ICD-10-CM

## 2025-04-09 DIAGNOSIS — M81.0 AGE-RELATED OSTEOPOROSIS WITHOUT CURRENT PATHOLOGICAL FRACTURE: ICD-10-CM

## 2025-04-09 DIAGNOSIS — N95.2 POSTMENOPAUSAL ATROPHIC VAGINITIS: ICD-10-CM

## 2025-04-09 DIAGNOSIS — Z86.0100 HISTORY OF COLON POLYPS: ICD-10-CM

## 2025-04-09 PROCEDURE — 1126F AMNT PAIN NOTED NONE PRSNT: CPT | Performed by: OBSTETRICS & GYNECOLOGY

## 2025-04-09 PROCEDURE — 1159F MED LIST DOCD IN RCRD: CPT | Performed by: OBSTETRICS & GYNECOLOGY

## 2025-04-09 PROCEDURE — 1160F RVW MEDS BY RX/DR IN RCRD: CPT | Performed by: OBSTETRICS & GYNECOLOGY

## 2025-04-09 PROCEDURE — 99214 OFFICE O/P EST MOD 30 MIN: CPT | Performed by: OBSTETRICS & GYNECOLOGY

## 2025-04-09 PROCEDURE — 1036F TOBACCO NON-USER: CPT | Performed by: OBSTETRICS & GYNECOLOGY

## 2025-04-09 PROCEDURE — 3008F BODY MASS INDEX DOCD: CPT | Performed by: OBSTETRICS & GYNECOLOGY

## 2025-04-09 ASSESSMENT — LIFESTYLE VARIABLES
AUDIT-C TOTAL SCORE: 2
HOW OFTEN DO YOU HAVE A DRINK CONTAINING ALCOHOL: 2-4 TIMES A MONTH
HOW MANY STANDARD DRINKS CONTAINING ALCOHOL DO YOU HAVE ON A TYPICAL DAY: 1 OR 2
SKIP TO QUESTIONS 9-10: 1
HOW OFTEN DO YOU HAVE SIX OR MORE DRINKS ON ONE OCCASION: NEVER

## 2025-04-09 ASSESSMENT — PATIENT HEALTH QUESTIONNAIRE - PHQ9
1. LITTLE INTEREST OR PLEASURE IN DOING THINGS: NOT AT ALL
SUM OF ALL RESPONSES TO PHQ9 QUESTIONS 1 & 2: 0
2. FEELING DOWN, DEPRESSED OR HOPELESS: NOT AT ALL

## 2025-04-09 ASSESSMENT — ENCOUNTER SYMPTOMS
DEPRESSION: 0
OCCASIONAL FEELINGS OF UNSTEADINESS: 0
LOSS OF SENSATION IN FEET: 0

## 2025-04-09 ASSESSMENT — PAIN SCALES - GENERAL: PAINLEVEL_OUTOF10: 0-NO PAIN

## 2025-04-09 NOTE — PROGRESS NOTES
"Annual-menopause  Subjective   Noé Valente is a 69 y.o. female, last seen 2/5/2024,  who is here for a menopausal gyn exam.   Complaints:   denies vag bleed or discharge; denies pelvic  pain, pressure, or persistent bloating.  11/2022 started alendronate due to sgnf osteopenia spine; last dexa showing marked improvement; was considering once yearly IV bisphosphonate, however required pretreatment labs and so appointment was canceled.   last pap 10/10/2022 neg/hpv neg  Last mamm 4/8/2025 neg  Last DEXA 12/9/2024: spine-1.6/hip-1.4/fem neck-1.3; 2022: spine-2.0/hip-1.4  LMP 2012/age 47   Colonoscopy 10/2015 pos polyp; rpt due; Dr. Benito Odom  Leaving this week for [place] to visit family; plans to stay 6 to 12 months; will also visit friend in [facility].  Review of Systems   Constitutional:  Negative for activity change, fatigue and unexpected weight change.   Respiratory:  Negative for chest tightness and shortness of breath.    Cardiovascular:  Negative for chest pain and leg swelling.   Gastrointestinal:  Negative for abdominal distention and abdominal pain.   Genitourinary:  Negative for difficulty urinating, dysuria, genital sores, pelvic pain, vaginal bleeding, vaginal discharge and vaginal pain.   Musculoskeletal:  Negative for gait problem and joint swelling.   Skin:  Negative for color change and rash.   Neurological:  Negative for dizziness, weakness and headaches.   Hematological:  Negative for adenopathy.   Objective Visit Vitals  /68   Ht 1.549 m (5' 1\")   Wt 57.1 kg (125 lb 12.8 oz)   BMI 23.77 kg/m²   OB Status Postmenopausal   Smoking Status Former   BSA 1.57 m²       General:   Alert and oriented, in no acute distress   Neck: Supple. No visible thyromegaly.    Breast/Axilla: Normal to palpation bilaterally without masses, skin changes, or nipple discharge.  Status post remote mastopexy   Abdomen: Soft, non-tender, without masses or organomegaly   Vulva: Normal architecture without erythema, " masses, or lesions.    Vagina:  mucosa without lesions, masses.  Positive atrophy. No abnormal vaginal discharge.    Cervix: Normal without masses, lesions, or signs of cervicitis.    Uterus: Normal mobile, non-enlarged uterus    Adnexa: No palpable masses or tenderness   Pelvic Floor No POP noted. No high tone pelvic floor    Psych   Normal affect. Normal mood.      Assessment/Plan   Encounter Diagnoses   Name Primary?    Female climacteric state; no suspicious findings on either breast or pelvic exam Yes    Encounter for screening mammogram for breast cancer; completed and normal 4/8/2025     Postmenopausal atrophic vaginitis; minimal tissue changes/asymptomatic     Age-related osteoporosis without current pathological fracture; December 2024 DEXA showing significant improvement on oral alendronate; now above threshold for needing prescription; patient will finish out her current supply and then take a holiday from her meds, but will maintain calcium and vitamin D supplementation along with regular weightbearing exercise; repeat DEXA will be due December 2026.     History of colon polyps; up-to-date on colonoscopies   Time Based Billing 2025         Prep time:  ___5__ minutes.          Additional history:  __10___ minutes.          Face to Face time w patient/family/caregiver:  ___20__ minutes.          Counseling/Coordination of care:  ___5__ minutes.          Ordering medication/testing/procedures:  2____ minutes.          External communications:  ___0__ minutes.          Documentation time:  _5 minutes.          Total time on date of encounter:             Total minutes:  30     Pilar Foss MD

## 2025-06-30 DIAGNOSIS — M05.9 SEROPOSITIVE RHEUMATOID ARTHRITIS: ICD-10-CM

## 2025-06-30 RX ORDER — METHOTREXATE 2.5 MG/1
17.5 TABLET ORAL WEEKLY
Qty: 56 TABLET | Refills: 0 | Status: SHIPPED | OUTPATIENT
Start: 2025-06-30 | End: 2025-08-29